# Patient Record
Sex: FEMALE | Race: WHITE
[De-identification: names, ages, dates, MRNs, and addresses within clinical notes are randomized per-mention and may not be internally consistent; named-entity substitution may affect disease eponyms.]

---

## 2019-10-18 ENCOUNTER — HOSPITAL ENCOUNTER (EMERGENCY)
Dept: HOSPITAL 41 - JD.ED | Age: 64
LOS: 1 days | Discharge: LEFT BEFORE DISCHARGE | End: 2019-10-19
Payer: MEDICARE

## 2019-10-18 VITALS — HEART RATE: 118 BPM | SYSTOLIC BLOOD PRESSURE: 165 MMHG | DIASTOLIC BLOOD PRESSURE: 111 MMHG

## 2019-10-18 DIAGNOSIS — Z79.899: ICD-10-CM

## 2019-10-18 DIAGNOSIS — Z87.891: ICD-10-CM

## 2019-10-18 DIAGNOSIS — R00.0: ICD-10-CM

## 2019-10-18 DIAGNOSIS — J44.9: ICD-10-CM

## 2019-10-18 DIAGNOSIS — Z79.84: ICD-10-CM

## 2019-10-18 DIAGNOSIS — Z88.6: ICD-10-CM

## 2019-10-18 DIAGNOSIS — E11.9: ICD-10-CM

## 2019-10-18 DIAGNOSIS — F32.9: ICD-10-CM

## 2019-10-18 DIAGNOSIS — E87.6: ICD-10-CM

## 2019-10-18 DIAGNOSIS — F41.9: Primary | ICD-10-CM

## 2019-10-18 DIAGNOSIS — Z88.5: ICD-10-CM

## 2019-10-19 NOTE — EDM.PDOCBH
ED HPI GENERAL MEDICAL PROBLEM





- General


Chief Complaint: Behavioral/Psych


Stated Complaint: [facility]


Time Seen by Provider: 10/19/19 01:11





- History of Present Illness


INITIAL COMMENTS - FREE TEXT/NARRATIVE: 





63-year-old female male brought in by EMS not feeling well.





Patient was having chest tightness palpitations was sweaty and generally didn't 

feel good. Upon arrival to the emergency room she felt much better she was 

hypertensive and tachycardic with a pulse rate in the low 100s. At the time of 

my initial interview the patient denied any pain she did feel little anxious 

palpitations have resolved. Apparently she would not tolerate the monitoring 

equipment and demand it was taken off I did convince her to check her blood 

pressure again and put heart monitor on.











- Related Data


 Allergies











Allergy/AdvReac Type Severity Reaction Status Date / Time


 


acetaminophen Allergy  Cannot Verified 10/18/19 22:43





[From Tylenol-Codeine #3]   Remember  


 


codeine Allergy  Cannot Verified 10/18/19 22:43





[From Tylenol-Codeine #3]   Remember  


 


morphine Allergy  Other Verified 10/18/19 22:43











Home Meds: 


 Home Meds





Clonidine. 1 tab PO DAILY 12/28/16 [History]


Cyclobenzaprine [Flexeril] 10 mg PO TID 12/28/16 [History]


Disipromine. 1 tab PO DAILY 12/28/16 [History]


Metformin. 1 tab PO BID 12/28/16 [History]


Pregabalin [Lyrica] 1 tab PO BID 12/28/16 [History]


Prozac. 1 tab PO DAILY 12/28/16 [History]


ARIPiprazole [Abilify] 10 mg PO DAILY 10/18/19 [History]


Alprazolam. 1 tab PO BEDTIME 10/18/19 [History]


Potassium Chloride [Klor-Con 10] 10 meq PO Q12H #10 tab.er 10/19/19 [Rx]











Past Medical History


Respiratory History: Reports: COPD


Musculoskeletal History: Reports: Fibromyalgia


Psychiatric History: Reports: Anxiety, Depression, PTSD, Schizophrenia


Endocrine/Metabolic History: Reports: Diabetes, Type II, Hypothyroidism





Social & Family History





- Tobacco Use


Smoking Status *Q: Former Smoker


Used Tobacco, but Quit: Yes


Month/Year Tobacco Last Used: 2018





- Caffeine Use


Caffeine Use: Reports: Coffee, Tea





- Recreational Drug Use


Recreational Drug Use: No





ED ROS GENERAL





- Review of Systems


Review Of Systems: See Below


Constitutional: Reports: No Symptoms


HEENT: Reports: No Symptoms


Respiratory: Reports: No Symptoms


Cardiovascular: Reports: Chest Pain, Palpitations


GI/Abdominal: Reports: No Symptoms


: Reports: No Symptoms


Musculoskeletal: Reports: No Symptoms


Skin: Reports: No Symptoms


Neurological: Reports: Other (Intermittent hand numbness).  Denies: Confusion, 

Dizziness, Headache





ED EXAM, BEHAVIORAL HEALTH





- Physical Exam


Exam: See Below


Exam Limited By: No Limitations


General Appearance: Alert, No Apparent Distress


Eye Exam: Bilateral Eye: Normal Inspection


Ears: Normal External Exam, Normal Canal, Hearing Grossly Normal, Normal TMs, 

Other (Him cerumen noted in the external canals)


Nose: Normal Inspection, Normal Mucosa, No Blood


Throat/Mouth: Normal Inspection, Normal Lips, Normal Teeth, Normal Gums, Normal 

Oropharynx, Normal Voice, No Airway Compromise


Head: Atraumatic, Normocephalic


Neck: Normal Inspection, Supple, Non-Tender, Full Range of Motion


Respiratory/Chest: No Respiratory Distress, Lungs Clear, Normal Breath Sounds, 

No Accessory Muscle Use, Chest Non-Tender


Cardiovascular: Normal Peripheral Pulses, Regular Rate, Rhythm, No Edema, No 

Gallop, No JVD, No Murmur, No Rub


GI/Abdominal: Normal Bowel Sounds, Soft, Non-Tender, No Organomegaly, No 

Distention, No Abnormal Bruit, No Mass, Other (Obese)


Back Exam: Normal Inspection.  No: CVA Tenderness (L), CVA Tenderness (R)


Neurological: Alert, Normal Cognition


Psychiatric: Other (She is somewhat anxious).  No: Suicidal Plan, Suicidal 

Thoughts, Auditory Hallucinations, Visual Hallucinations, Grandiose Thoughts, 

Pressured Speech


Skin Exam: Warm, Dry, Intact





COURSE, BEHAVIORAL HEALTH COMP





- Course


Vital Signs: 


 Last Vital Signs











Temp  36.2 C   10/18/19 22:40


 


Pulse  118 H  10/18/19 22:40


 


Resp  15   10/18/19 22:40


 


BP  165/111 H  10/18/19 22:40


 


Pulse Ox  91 L  10/18/19 22:40











Orders, Labs, Meds: 


 Active Orders 24 hr











 Category Date Time Status


 


 EKG Documentation Completion [RC] STAT Care  10/19/19 01:29 Active


 


 MAGNESIUM [CHEM] Stat Lab  10/19/19 02:34 Ordered








 Laboratory Tests











  10/19/19 10/19/19 Range/Units





  01:50 01:50 


 


WBC  5.91   (3.98-10.04)  K/mm3


 


RBC  4.18   (3.98-5.22)  M/mm3


 


Hgb  12.0  D   (11.2-15.7)  gm/dl


 


Hct  37.5   (34.1-44.9)  %


 


MCV  89.7   (79.4-94.8)  fl


 


MCH  28.7   (25.6-32.2)  pg


 


MCHC  32.0 L   (32.2-35.5)  g/dl


 


RDW Std Deviation  46.2   (36.4-46.3)  fL


 


Plt Count  142 L   (182-369)  K/mm3


 


MPV  11.0   (9.4-12.3)  fl


 


Neutrophils % (Manual)  75 H   (40-60)  %


 


Band Neutrophils %  0   (0-10)  %


 


Lymphocytes % (Manual)  18 L   (20-40)  %


 


Atypical Lymphs %  0   %


 


Monocytes % (Manual)  5   (2-10)  %


 


Eosinophils % (Manual)  2   (0.7-5.8)  %


 


Basophils % (Manual)  0 L   (0.1-1.2)  


 


Platelet Estimate  Adequate   


 


Plt Morphology Comment  Normal   


 


RBC Morph Comment  Normal   


 


Sodium   141  (136-145)  mEq/L


 


Potassium   3.3 L  (3.5-5.1)  mEq/L


 


Chloride   104  ()  mEq/L


 


Carbon Dioxide   28  (21-32)  mEq/L


 


Anion Gap   12.3  (5-15)  


 


BUN   16  (7-18)  mg/dL


 


Creatinine   0.8  (0.55-1.02)  mg/dL


 


Est Cr Clr Drug Dosing   66.45  mL/min


 


Estimated GFR (MDRD)   > 60  (>60)  mL/min


 


BUN/Creatinine Ratio   20.0 H  (14-18)  


 


Glucose   167 H  ()  mg/dL


 


Calcium   9.2  (8.5-10.1)  mg/dL


 


Total Bilirubin   0.4  (0.2-1.0)  mg/dL


 


AST   30  (15-37)  U/L


 


ALT   40  (14-59)  U/L


 


Alkaline Phosphatase   128 H  ()  U/L


 


Troponin I   < 0.017  (0.00-0.056)  ng/mL


 


Total Protein   7.8  (6.4-8.2)  g/dl


 


Albumin   3.0 L  (3.4-5.0)  g/dl


 


Globulin   4.8  gm/dL


 


Albumin/Globulin Ratio   0.6 L  (1-2)  








Medications














Discontinued Medications














Generic Name Dose Route Start Last Admin





  Trade Name Giacomo  PRN Reason Stop Dose Admin


 


Lorazepam  1 mg  10/19/19 02:35  





  Ativan  PO  10/19/19 02:36  





  ONETIME ONE   





     





     





     





     











Medical Clearance: 





10/19/19 03:00


I tried to get the patient to take some Ativan to calm her down and she agreed 

to it initially. Then I recommended she take some oral potassium as her 

potassium was somewhat low. Her blood sugar is high troponin still pending EKG 

shows a sinus tachycardia rate 103 and then she decided she didn't want to take 

the Ativan but would take potassium is still recommended she take the Ativan 

patient thought about it for a little bit and then she decided she just wanted 

to leave I discussed halle with her that I'm worried about her fast heart rate 

and have no good explanation for her symptoms other than the anxiety that she 

has enough risk factors that some more ominous intentional medical problems 

should be excluded if possible and she still wanted to go home I did discharge 

her with a prescription for potassium she was willing to take that.





Departure





- Departure


Time of Disposition: 02:44


Disposition: Against Medical Advice 07


Clinical Impression: 


 Anxiety, Tachycardia, Hypokalemia








- Discharge Information


Prescriptions: 


Potassium Chloride [Klor-Con 10] 10 meq PO Q12H #10 tab.er


Instructions:  Generalized Anxiety Disorder, Adult, Hypokalemia, Sinus 

Tachycardia


Referrals: 


PCP,None [Primary Care Provider] - 


Forms:  ED Department Discharge


Additional Instructions: 


Return to the emergency room with any questions problems worsening symptoms





Follow-up with her regular provider early this next week you can go to the 

Hospital clinic [phone]





- My Orders


Last 24 Hours: 


My Active Orders





10/19/19 01:29


EKG Documentation Completion [RC] STAT 





10/19/19 02:34


MAGNESIUM [CHEM] Stat 














- Assessment/Plan


Last 24 Hours: 


My Active Orders





10/19/19 01:29


EKG Documentation Completion [RC] STAT 





10/19/19 02:34


MAGNESIUM [CHEM] Stat

## 2019-10-24 ENCOUNTER — HOSPITAL ENCOUNTER (EMERGENCY)
Dept: HOSPITAL 41 - JD.ED | Age: 64
Discharge: PSYCHIATRIC OR SUBSTANCE USE FACILITY | End: 2019-10-24
Payer: MEDICARE

## 2019-10-24 VITALS — SYSTOLIC BLOOD PRESSURE: 151 MMHG | DIASTOLIC BLOOD PRESSURE: 104 MMHG | HEART RATE: 100 BPM

## 2019-10-24 DIAGNOSIS — Z88.5: ICD-10-CM

## 2019-10-24 DIAGNOSIS — Z88.6: ICD-10-CM

## 2019-10-24 DIAGNOSIS — G40.909: ICD-10-CM

## 2019-10-24 DIAGNOSIS — E11.9: ICD-10-CM

## 2019-10-24 DIAGNOSIS — J44.9: ICD-10-CM

## 2019-10-24 DIAGNOSIS — Z79.899: ICD-10-CM

## 2019-10-24 DIAGNOSIS — Z79.4: ICD-10-CM

## 2019-10-24 DIAGNOSIS — F32.9: ICD-10-CM

## 2019-10-24 DIAGNOSIS — F20.89: Primary | ICD-10-CM

## 2019-10-24 PROCEDURE — 71046 X-RAY EXAM CHEST 2 VIEWS: CPT

## 2019-10-24 PROCEDURE — 85025 COMPLETE CBC W/AUTO DIFF WBC: CPT

## 2019-10-24 PROCEDURE — 80306 DRUG TEST PRSMV INSTRMNT: CPT

## 2019-10-24 PROCEDURE — G0480 DRUG TEST DEF 1-7 CLASSES: HCPCS

## 2019-10-24 PROCEDURE — 84484 ASSAY OF TROPONIN QUANT: CPT

## 2019-10-24 PROCEDURE — 90686 IIV4 VACC NO PRSV 0.5 ML IM: CPT

## 2019-10-24 PROCEDURE — 99285 EMERGENCY DEPT VISIT HI MDM: CPT

## 2019-10-24 PROCEDURE — 36415 COLL VENOUS BLD VENIPUNCTURE: CPT

## 2019-10-24 PROCEDURE — 93005 ELECTROCARDIOGRAM TRACING: CPT

## 2019-10-24 PROCEDURE — 80053 COMPREHEN METABOLIC PANEL: CPT

## 2019-10-24 PROCEDURE — 84443 ASSAY THYROID STIM HORMONE: CPT

## 2019-10-24 PROCEDURE — G0008 ADMIN INFLUENZA VIRUS VAC: HCPCS

## 2019-10-24 NOTE — CR
Chest: Two views of the chest are obtained.

 

Comparison: Prior chest CT study of 12/28/16 is available.

 

Findings: Heart size and mediastinum are normal.  Lungs show minimal 

linear densities which is most likely due to subsegmental atelectasis.

  Lungs otherwise are clear.  Minimal scoliosis is noted within the 

spine with slight scattered degenerative change.

 

Impression:

1.  Findings as described above which is believed to be incidental.

2.  Nothing acute is suspected.

 

Diagnostic code #2
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
## 2020-01-06 ENCOUNTER — HOSPITAL ENCOUNTER (INPATIENT)
Dept: HOSPITAL 41 - JD.ED | Age: 65
LOS: 3 days | Discharge: NURSING, REHAB OR LONG-TERM CARE | DRG: 637 | End: 2020-01-09
Payer: MEDICARE

## 2020-01-06 DIAGNOSIS — B37.89: ICD-10-CM

## 2020-01-06 DIAGNOSIS — D72.828: ICD-10-CM

## 2020-01-06 DIAGNOSIS — E86.9: ICD-10-CM

## 2020-01-06 DIAGNOSIS — D72.829: ICD-10-CM

## 2020-01-06 DIAGNOSIS — S00.03XD: ICD-10-CM

## 2020-01-06 DIAGNOSIS — Z88.5: ICD-10-CM

## 2020-01-06 DIAGNOSIS — F32.9: ICD-10-CM

## 2020-01-06 DIAGNOSIS — R53.1: ICD-10-CM

## 2020-01-06 DIAGNOSIS — Z88.8: ICD-10-CM

## 2020-01-06 DIAGNOSIS — R32: ICD-10-CM

## 2020-01-06 DIAGNOSIS — Z79.84: ICD-10-CM

## 2020-01-06 DIAGNOSIS — J44.9: ICD-10-CM

## 2020-01-06 DIAGNOSIS — B37.2: ICD-10-CM

## 2020-01-06 DIAGNOSIS — E83.39: ICD-10-CM

## 2020-01-06 DIAGNOSIS — R27.0: ICD-10-CM

## 2020-01-06 DIAGNOSIS — F41.9: ICD-10-CM

## 2020-01-06 DIAGNOSIS — N18.3: ICD-10-CM

## 2020-01-06 DIAGNOSIS — E78.00: ICD-10-CM

## 2020-01-06 DIAGNOSIS — Z90.49: ICD-10-CM

## 2020-01-06 DIAGNOSIS — Z79.899: ICD-10-CM

## 2020-01-06 DIAGNOSIS — R74.0: ICD-10-CM

## 2020-01-06 DIAGNOSIS — F23: ICD-10-CM
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
## 2020-01-06 DIAGNOSIS — E11.10: Primary | ICD-10-CM

## 2020-01-06 DIAGNOSIS — E03.9: ICD-10-CM

## 2020-01-06 DIAGNOSIS — E11.22: ICD-10-CM

## 2020-01-06 DIAGNOSIS — E88.09: ICD-10-CM

## 2020-01-06 DIAGNOSIS — F20.9: ICD-10-CM

## 2020-01-06 DIAGNOSIS — G93.41: ICD-10-CM

## 2020-01-06 DIAGNOSIS — N17.9: ICD-10-CM

## 2020-01-06 DIAGNOSIS — E66.01: ICD-10-CM

## 2020-01-06 DIAGNOSIS — J10.1: ICD-10-CM

## 2020-01-06 DIAGNOSIS — E86.0: ICD-10-CM

## 2020-01-06 DIAGNOSIS — H54.7: ICD-10-CM

## 2020-01-06 DIAGNOSIS — M79.7: ICD-10-CM

## 2020-01-06 DIAGNOSIS — G40.909: ICD-10-CM

## 2020-01-06 LAB — HBA1C BLD-MCNC: 10.9 % (ref 4.5–6.2)

## 2020-01-06 PROCEDURE — C9113 INJ PANTOPRAZOLE SODIUM, VIA: HCPCS

## 2020-01-06 PROCEDURE — 80053 COMPREHEN METABOLIC PANEL: CPT

## 2020-01-06 PROCEDURE — 99285 EMERGENCY DEPT VISIT HI MDM: CPT

## 2020-01-06 PROCEDURE — 36415 COLL VENOUS BLD VENIPUNCTURE: CPT

## 2020-01-06 PROCEDURE — 84484 ASSAY OF TROPONIN QUANT: CPT

## 2020-01-06 PROCEDURE — 96360 HYDRATION IV INFUSION INIT: CPT

## 2020-01-06 PROCEDURE — 85007 BL SMEAR W/DIFF WBC COUNT: CPT

## 2020-01-06 PROCEDURE — 85610 PROTHROMBIN TIME: CPT

## 2020-01-06 PROCEDURE — 84100 ASSAY OF PHOSPHORUS: CPT

## 2020-01-06 PROCEDURE — 84443 ASSAY THYROID STIM HORMONE: CPT

## 2020-01-06 PROCEDURE — 83036 HEMOGLOBIN GLYCOSYLATED A1C: CPT

## 2020-01-06 PROCEDURE — 83930 ASSAY OF BLOOD OSMOLALITY: CPT

## 2020-01-06 PROCEDURE — 85027 COMPLETE CBC AUTOMATED: CPT

## 2020-01-06 PROCEDURE — 71045 X-RAY EXAM CHEST 1 VIEW: CPT

## 2020-01-06 PROCEDURE — 82009 KETONE BODYS QUAL: CPT

## 2020-01-06 PROCEDURE — 85025 COMPLETE CBC W/AUTO DIFF WBC: CPT

## 2020-01-06 PROCEDURE — 83880 ASSAY OF NATRIURETIC PEPTIDE: CPT

## 2020-01-06 PROCEDURE — 70450 CT HEAD/BRAIN W/O DYE: CPT

## 2020-01-06 PROCEDURE — 82803 BLOOD GASES ANY COMBINATION: CPT

## 2020-01-06 PROCEDURE — 36600 WITHDRAWAL OF ARTERIAL BLOOD: CPT

## 2020-01-06 PROCEDURE — 83605 ASSAY OF LACTIC ACID: CPT

## 2020-01-06 PROCEDURE — 93005 ELECTROCARDIOGRAM TRACING: CPT

## 2020-01-06 PROCEDURE — 86140 C-REACTIVE PROTEIN: CPT

## 2020-01-06 PROCEDURE — 87040 BLOOD CULTURE FOR BACTERIA: CPT

## 2020-01-06 PROCEDURE — 81001 URINALYSIS AUTO W/SCOPE: CPT

## 2020-01-06 PROCEDURE — 83735 ASSAY OF MAGNESIUM: CPT

## 2020-01-06 RX ADMIN — HEPARIN SODIUM SCH UNITS: 5000 INJECTION, SOLUTION INTRAVENOUS; SUBCUTANEOUS at 18:19

## 2020-01-06 NOTE — EDM.PDOC
ED HPI GENERAL MEDICAL PROBLEM





- General


Chief Complaint: Gastrointestinal Problem


Stated Complaint: [facility]


Time Seen by Provider: 01/06/20 12:49


Source of Information: Reports: Patient


History Limitations: Reports: No Limitations





- History of Present Illness


INITIAL COMMENTS - FREE TEXT/NARRATIVE: 


64-year-old female who is a known type II diabetic presents to the ED generally 

feeling very poor for the last 10 days. She recently relocated to [facility] and has no primary care physician. Her sugars used to be 

controlled with Trulicity once weekly  and using basal dose of insulin ( Lantus 

solostar)  30 units once daily daily and metformin 1000 mg twice a day. Since 

blood sugars at home have been running much higher than normal. She presents 

with severe polyuria and polydipsia. Weight herself and she is morbidly obese. 

She can barely walk or get to the bathroom even with the aid of her daughter 

who does most of her care. Haroldo she fell recently and has a mild contusion to 

her occipital scalp. She also complains that her right arm doesn't seem to work 

as well as it should. She doesn't walk without the aid of a walker. Complains 

of feeling very weak dizzy nauseated and can't eat.





Onset: Gradual


Onset Date: 01/01/20 (Has been sick apparently for a couple of months but worse 

the last week. In planning of diffuse abdominal pain)


Duration: Week(s):, Getting Worse


Location: Reports: Generalized (MRI sense of illness I suspect primarily due to 

uncontrolled type 2 diabetes.)


Quality: Reports: Other (Nausea weakness causing falls at home.)


Severity: Severe


Improves with: Reports: None


Worsens with: Reports: None


Context: Reports: Other.  Denies: Activity, Exercise, Lifting, Sick Contact, 

Trauma


Associated Symptoms: Reports: Loss of Appetite, Malaise, Nausea/Vomiting, 

Shortness of Breath, Weakness (Severe generalized weakness and can barely walk 

on her own volition. She is also hypoxic on room air.).  Denies: Confusion (

Artery is providing as much care she can for her at home but is failing at this 

time.), Chest Pain, Cough, cough w sputum, Diaphoresis, Fever/Chills, Headaches

, Rash, Seizure, Syncope


Treatments PTA: Reports: Other (see below)


  ** Left Abdominal


Pain Score (Numeric/FACES): 10





- Related Data


 Allergies











Allergy/AdvReac Type Severity Reaction Status Date / Time


 


acetaminophen Allergy  Cannot Verified 10/24/19 11:12





[From Tylenol-Codeine #3]   Remember  


 


codeine Allergy  Cannot Verified 10/24/19 11:12





[From Tylenol-Codeine #3]   Remember  


 


morphine AdvReac  Headache Verified 01/06/20 14:43











Home Meds: 


 Home Meds





Clonidine. 0.1 mg PO BID 12/28/16 [History]


Cyclobenzaprine [Flexeril] 10 mg PO TID PRN 12/28/16 [History]


Metformin. 1,000 mg PO BID 12/28/16 [History]


Pregabalin [Lyrica] 300 mg PO BID 12/28/16 [History]


Prozac. 80 mg PO DAILY 12/28/16 [History]


ARIPiprazole [Abilify] 40 mg PO DAILY 10/18/19 [History]


Alprazolam. 0.25 mg PO BID PRN 10/18/19 [History]


Dulaglutide [Trulicity] 1.5 mg SQ TU 10/24/19 [History]


Paliperidone [Paliperidone ER] 6 mg PO DAILY 01/06/20 [History]


Rosuvastatin Calcium 20 mg PO BEDTIME 01/06/20 [History]


hydrOXYzine HCL [hydrOXYzine] 50 mg PO BEDTIME 01/06/20 [History]











Past Medical History


HEENT History: Reports: Impaired Vision


Other HEENT History: wears eyeglasses, has no teeth, frequent abscesses to "nubs

" of teeth that are still in place.


Cardiovascular History: Reports: High Cholesterol


Respiratory History: Reports: COPD


Genitourinary History: Reports: Pyelonephritis, Urinary Incontinence, UTI, 

Recurrent


OB/GYN History: Reports: Pregnancy


Musculoskeletal History: Reports: Fracture, Fibromyalgia


Neurological History: Reports: Seizure


Psychiatric History: Reports: Anxiety, Depression, PTSD, Schizophrenia


Endocrine/Metabolic History: Reports: Diabetes, Type II, Hypothyroidism





- Infectious Disease History


Infectious Disease History: Reports: Chicken Pox, Hepatitis C, Measles, Mumps, 

Scarlet Fever





- Past Surgical History


GI Surgical History: Reports: Appendectomy, Cholecystectomy


Musculoskeletal Surgical History: Reports: ORIF





Social & Family History





- Tobacco Use


Smoking Status *Q: Never Smoker


Second Hand Smoke Exposure: No





- Caffeine Use


Caffeine Use: Reports: Coffee, Soda





- Recreational Drug Use


Recreational Drug Use: No





- Living Situation & Occupation


Living situation: Reports: Single


Occupation: Disabled





ED ROS GENERAL





- Review of Systems


Review Of Systems: See Below


Constitutional: Reports: Malaise, Weakness, Fatigue, Decreased Appetite.  Denies

: Fever, Chills


HEENT: Denies: Glasses


Respiratory: Reports: Shortness of Breath.  Denies: Wheezing, Pleuritic Chest 

Pain, Cough, Sputum


Cardiovascular: Reports: Blood Pressure Problem, Dyspnea on Exertion, Edema, 

Lightheadedness.  Denies: Chest Pain, Claudication, Orthopnea


Endocrine: Reports: Fatigue, High Glucose


GI/Abdominal: Reports: Decreased Appetite, Nausea, Vomiting (Vomited once 

bilious material last night)


: Reports: Frequency, Incontinence (Continent of urine at times mostly stress-

induced component of urgency.)


Musculoskeletal: Reports: Joint Pain (Sips low back and neck and shoulders at 

times)


Skin: Reports: Other (Patient has intertrigo primarily in the inguinal and 

lower abdomen under pannus. She also has perineal inflammation from inability 

to clean herself properly. His excoriations of the perineum and perianal 

tissues.)


Neurological: Reports: Difficulty Walking (Use a walker to get along but can't 

walk), Change in Speech ( at this point time.), Other (Complains of difficulty 

walking and that her right arm is not working quite as well as it should. Of 

note she is right hand dominant.)


Hematologic/Lymphatic: Reports: No Symptoms


Immunologic: Reports: No Symptoms





ED EXAM, GI/ABD





- Physical Exam


Exam: See Below


Exam Limited By: Altered Mental Status ( is mildly confused and can't 

answer questions appropriately. She has a friend with her who is providing most 

of the history.)


General Appearance: Mild Distress, Obese, Other (Confused. She is disoriented 

to person place and time.)


Eyes: Bilateral: Normal Appearance


Throat/Mouth: Other (Doesn't tongue is extremely dry and shrunken. Patient has 

ketones on her breath.).  No: Normal Teeth, Normal Gums


Head: Other


Neck: Normal Inspection (She is complaining of pain on the vertex of her 

occipital scalp but I cannot palpate any deformities or scalp hematomas. 

Apparently she fell and injured her head about a week ago.), Limited Range of 

Motion.  No: Carotid Bruit, Lymphadenopathy (L) (As a cold neck with limited 

range of motion.), Lymphadenopathy (R)


Respiratory/Chest: No Accessory Muscle Use, Respiratory Distress (Mild 

tachypnea at rest presumably due to ketosis.), Decreased Breath Sounds (

Diminished breath sounds to the lower lung fields due to splinting respirations 

and shallow respirations.).  No: Rales, Rhonchi, Wheezing


Cardiovascular: No Gallop (Sinus tachycardia at 125 at the bedside.), No JVD, 

No Murmur, No Rub, Tachycardia.  No: Normal Peripheral Pulses (No pulses 

palpable in her feet.), No Edema


GI/Abdominal Exam: Normal Bowel Sounds, Soft, Non-Tender, No Organomegaly, No 

Mass, Pelvis Stable, Other (Morbidly obese. Abdominal girth limits ability to 

palpate solid organs. She has a long linear open cholecystectomy scar right 

upper quadrant of the abdomen.)


 (Female) Exam: Other (Has excoriations of the perianal tissues and perineum 

with likely candidiasis in this area. All bladder are also somewhat involved.)


Rectal (Female) Exam: Other (Area anal inflamed inflammation due to 

excoriations of the tissue from likely stool.)


Back Exam: Decreased Range of Motion.  No: CVA Tenderness (L), Muscle Spasm, 

Paraspinal Tenderness


Extremities: Pedal Edema (1+ pitting edema around the ankles and dorsal feet.)


Neurological: Oriented (Oriented to person but not to place and time.), Sensory/

Motor Deficit (She is complaining of some numbness and tingling and poor 

ability to control her right upper extremity. States it's week.).  No: Alert, 

CN II-XII Intact, Normal Cognition, Normal Gait


Psychiatric: Flat Affect


Skin Exam: Warm, Dry, Other (She has intertrigo primarily in the undersurface 

of her abdominal pannus in both groins and suprapubic area. This is secondary 

to candidiasis. Similarly there is perianal and nuchal cleft excoriations with 

white discharge likely candidiasis as well.)





EKG INTERPRETATION


EKG Date: 01/06/20


Time: 13:25


Rhythm: Other


Rate (Beats/Min): 123


Axis: LAD-Left Axis Deviation


P-Wave: Enlarged (-21 consider left atrial hypertrophy)


QRS: Other (There is a Q-wave in V1. There is diffuse poor R-wave progression 

throughout all of the precordial leads with decreased voltage in both limb and 

precordial leads. Rule out ischemia in the anteroseptal wall.)


ST-T: Other (Wondering baseline in the limb leads extend on amenable to 

interpretation of the ST segments.)


QT: Prolonged


EKG Interpretation Comments: 


Abnormal ECG








Course





- Vital Signs


Last Recorded V/S: 


 Last Vital Signs











Temp  36.4 C   01/06/20 11:50


 


Pulse  122 H  01/06/20 11:50


 


Resp  20   01/06/20 11:50


 


BP  137/81   01/06/20 11:50


 


Pulse Ox  93 L  01/06/20 11:50














- Orders/Labs/Meds


Orders: 


 Active Orders 24 hr











 Category Date Time Status


 


 Blood Glucose Check, Bedside [RC] ONETIME Care  01/06/20 12:58 Active


 


 EKG Documentation Completion [RC] STAT Care  01/06/20 12:57 Active


 


 Insert Patterson Catheter [Insert Urinary Catheter] [OM.PC] Care  01/06/20 12:30 

Ordered





 Q24H   


 


 Urinary Catheter Assessment [RC] ASDIRECTED Care  01/06/20 12:30 Active


 


 CULTURE BLOOD [BC] Stat Lab  01/06/20 12:58 Ordered


 


 CULTURE BLOOD [BC] Stat Lab  01/06/20 12:58 Ordered


 


 Insulin Regular, Human [HumuLIN R] 100 unit Med  01/06/20 14:37 Active





 Sodium Chloride 0.9% [Normal Saline] 99 ml   





 IV TITRATE   


 


 Blood Culture x2 Reflex Set [OM.PC] Stat Oth  01/06/20 12:58 Ordered








 Medication Orders





Albuterol/Ipratropium (Duoneb 3.0-0.5 Mg/3 Ml)  3 ml NEB Q4H PRN


   PRN Reason: Shortness Of Breath/wheezing


Heparin Sodium (Porcine) (Heparin Sodium)  5,000 units SUBCUT Q8H [facility]YLON


Hydromorphone HCl (Dilaudid)  0.5 mg IVPUSH Q2H PRN


   PRN Reason: Pain (severe 7-10)


Insulin Human Regular 100 unit (/ Sodium Chloride)  100 mls @ 10 mls/hr IV 

TITRATE DYLON; Protocol


   Last Admin: 01/06/20 14:56  Dose: 6 unit/hr, 6 mls/hr


Potassium Chloride 10 meq/ (Premix)  100 mls @ 100 mls/hr IV Q1H DYLON


   Stop: 01/06/20 18:14


   Last Admin: 01/06/20 17:15  Dose: 100 mls/hr


   Infusion: 01/06/20 16:35  Dose: 100 mls/hr


   Admin: 01/06/20 15:35  Dose: 100 mls/hr


Lactated Ringer's (Ringers, Lactated)  1,000 mls @ 125 mls/hr IV ASDIRECTED [facility]


Promethazine HCl 6.25 mg/ (Sodium Chloride)  50.25 mls @ 100 mls/hr IV Q6H PRN


   PRN Reason: Nausea/Vomiting


Ibuprofen (Motrin)  600 mg PO Q6H PRN


   PRN Reason: Pain (moderate 4-6)


Ondansetron HCl (Zofran)  4 mg IV Q6H PRN


   PRN Reason: Nausea/Vomiting


Pantoprazole Sodium (Protonix Iv***)  40 mg IV Q12H [facility]








Labs: 


 Laboratory Tests











  01/06/20 01/06/20 01/06/20 Range/Units





  12:40 12:40 12:41 


 


WBC    14.86 H  (3.98-10.04)  K/mm3


 


RBC    5.92 H  (3.98-5.22)  M/mm3


 


Hgb    16.4 H D  (11.2-15.7)  gm/dl


 


Hct    49.6 H  (34.1-44.9)  %


 


MCV    83.8  D  (79.4-94.8)  fl


 


MCH    27.7  (25.6-32.2)  pg


 


MCHC    33.1  (32.2-35.5)  g/dl


 


RDW Std Deviation    43.9  (36.4-46.3)  fL


 


Plt Count    308  D  (182-369)  K/mm3


 


MPV    13.2 H  (9.4-12.3)  fl


 


Neut % (Auto)    84.6 H  (34.0-71.1)  %


 


Lymph % (Auto)    7.9 L  (19.3-51.7)  %


 


Mono % (Auto)    7.2  (4.7-12.5)  %


 


Eos % (Auto)    0 L  (0.7-5.8)  


 


Baso % (Auto)    0.2  (0.1-1.2)  %


 


Neut # (Auto)    12.57 H  (1.56-6.13)  K/mm3


 


Lymph # (Auto)    1.17 L  (1.18-3.74)  K/mm3


 


Mono # (Auto)    1.07 H  (0.24-0.36)  K/mm3


 


Eos # (Auto)    0.00 L  (0.04-0.36)  K/mm3


 


Baso # (Auto)    0.03  (0.01-0.08)  K/mm3


 


Neutrophils % (Manual)     (40-60)  %


 


Band Neutrophils %     (0-10)  %


 


Lymphocytes % (Manual)     (20-40)  %


 


Atypical Lymphs %     %


 


Monocytes % (Manual)     (2-10)  %


 


Eosinophils % (Manual)     (0.7-5.8)  %


 


Basophils % (Manual)     (0.1-1.2)  


 


Manual Slide Review    Abnormal smear  


 


Platelet Estimate     


 


Anisocytosis     


 


RBC Morph Comment     


 


PT     (9.7-12.0)  SECONDS


 


INR     


 


Puncture Site     


 


ABG pH     (7.35-7.45)  


 


ABG pCO2     (35.0-45.0)  mmHg


 


ABG HCO3     (22.0-26.0)  meq/L


 


ABG O2 Saturation     (96.0-97.0)  %


 


ABG Base Excess     (-2-2.0)  


 


Yuri Test     


 


O2 Delivery Device     


 


FiO2     (21..00)  %


 


Sodium  145    (136-145)  mEq/L


 


Potassium  3.8    (3.5-5.1)  mEq/L


 


Chloride  95 L    ()  mEq/L


 


Carbon Dioxide  22    (21-32)  mEq/L


 


Anion Gap  31.8 H    (5-15)  


 


BUN  28 H    (7-18)  mg/dL


 


Creatinine  1.5 H    (0.55-1.02)  mg/dL


 


Est Cr Clr Drug Dosing  34.99    mL/min


 


Estimated GFR (MDRD)  35    (>60)  mL/min


 


BUN/Creatinine Ratio  18.7 H    (14-18)  


 


Glucose  677 H*    ()  mg/dL


 


Hemoglobin A1c     (4.50-6.20)  %


 


Serum Osmolality     (280-300)  mosm/kg


 


Lactic Acid     (0.4-2.0)  mmol/L


 


Calcium  9.5    (8.5-10.1)  mg/dL


 


Phosphorus     (2.6-4.7)  mg/dL


 


Magnesium     (1.8-2.4)  mg/dl


 


Total Bilirubin  1.0    (0.2-1.0)  mg/dL


 


AST  60 H    (15-37)  U/L


 


ALT  70 H    (14-59)  U/L


 


Alkaline Phosphatase  257 H    ()  U/L


 


Troponin I     (0.00-0.056)  ng/mL


 


C-Reactive Protein     (<1.0)  mg/dL


 


NT-Pro-B Natriuret Pep   170 H   (0-125)  pg/mL


 


Total Protein  8.5 H    (6.4-8.2)  g/dl


 


Albumin  3.1 L    (3.4-5.0)  g/dl


 


Globulin  5.4    gm/dL


 


Albumin/Globulin Ratio  0.6 L    (1-2)  


 


TSH 3rd Generation     (0.358-3.74)  uIU/mL


 


Urine Color     (Yellow)  


 


Urine Appearance     (Clear)  


 


Urine pH     (5.0-8.0)  


 


Ur Specific Gravity     (1.005-1.030)  


 


Urine Protein     (Negative)  


 


Urine Glucose (UA)     (Negative)  


 


Urine Ketones     (Negative)  


 


Urine Occult Blood     (Negative)  


 


Urine Nitrite     (Negative)  


 


Urine Bilirubin     (Negative)  


 


Urine Urobilinogen     (0.2-1.0)  


 


Ur Leukocyte Esterase     (Negative)  


 


Urine RBC     (0-5)  /hpf


 


Urine WBC     (0-5)  /hpf


 


Ur Squamous Epith Cells     (0-5)  /hpf


 


Urine Bacteria     (FEW)  /hpf


 


Hyaline Casts     (0-5)  /lpf


 


Urine Mucus     (FEW)  /hpf


 


Ketones     (0.0-0.3)  mM














  01/06/20 01/06/20 01/06/20 Range/Units





  12:50 13:01 14:10 


 


WBC    15.38 H  (3.98-10.04)  K/mm3


 


RBC    5.94 H  (3.98-5.22)  M/mm3


 


Hgb    16.6 H  (11.2-15.7)  gm/dl


 


Hct    49.9 H  (34.1-44.9)  %


 


MCV    84.0  (79.4-94.8)  fl


 


MCH    27.9  (25.6-32.2)  pg


 


MCHC    33.3  (32.2-35.5)  g/dl


 


RDW Std Deviation    44.4  (36.4-46.3)  fL


 


Plt Count    228  D  (182-369)  K/mm3


 


MPV    12.7 H  (9.4-12.3)  fl


 


Neut % (Auto)     (34.0-71.1)  %


 


Lymph % (Auto)     (19.3-51.7)  %


 


Mono % (Auto)     (4.7-12.5)  %


 


Eos % (Auto)     (0.7-5.8)  


 


Baso % (Auto)     (0.1-1.2)  %


 


Neut # (Auto)     (1.56-6.13)  K/mm3


 


Lymph # (Auto)     (1.18-3.74)  K/mm3


 


Mono # (Auto)     (0.24-0.36)  K/mm3


 


Eos # (Auto)     (0.04-0.36)  K/mm3


 


Baso # (Auto)     (0.01-0.08)  K/mm3


 


Neutrophils % (Manual)    84 H  (40-60)  %


 


Band Neutrophils %    0  (0-10)  %


 


Lymphocytes % (Manual)    8 L  (20-40)  %


 


Atypical Lymphs %    0  %


 


Monocytes % (Manual)    7  (2-10)  %


 


Eosinophils % (Manual)    1  (0.7-5.8)  %


 


Basophils % (Manual)    0 L  (0.1-1.2)  


 


Manual Slide Review     


 


Platelet Estimate    Adequate  


 


Anisocytosis    1+ slight  


 


RBC Morph Comment    Not Reportable  


 


PT     (9.7-12.0)  SECONDS


 


INR     


 


Puncture Site   Rt radial   


 


ABG pH   7.40   (7.35-7.45)  


 


ABG pCO2   28.5 L   (35.0-45.0)  mmHg


 


ABG HCO3   17.3 L   (22.0-26.0)  meq/L


 


ABG O2 Saturation   94.4 L   (96.0-97.0)  %


 


ABG Base Excess   -5.7 L   (-2-2.0)  


 


Yuri Test   Positive   


 


O2 Delivery Device   Room air   


 


FiO2   0.00 L   (21..00)  %


 


Sodium     (136-145)  mEq/L


 


Potassium     (3.5-5.1)  mEq/L


 


Chloride     ()  mEq/L


 


Carbon Dioxide     (21-32)  mEq/L


 


Anion Gap     (5-15)  


 


BUN     (7-18)  mg/dL


 


Creatinine     (0.55-1.02)  mg/dL


 


Est Cr Clr Drug Dosing     mL/min


 


Estimated GFR (MDRD)     (>60)  mL/min


 


BUN/Creatinine Ratio     (14-18)  


 


Glucose     ()  mg/dL


 


Hemoglobin A1c     (4.50-6.20)  %


 


Serum Osmolality     (280-300)  mosm/kg


 


Lactic Acid     (0.4-2.0)  mmol/L


 


Calcium     (8.5-10.1)  mg/dL


 


Phosphorus     (2.6-4.7)  mg/dL


 


Magnesium     (1.8-2.4)  mg/dl


 


Total Bilirubin     (0.2-1.0)  mg/dL


 


AST     (15-37)  U/L


 


ALT     (14-59)  U/L


 


Alkaline Phosphatase     ()  U/L


 


Troponin I     (0.00-0.056)  ng/mL


 


C-Reactive Protein     (<1.0)  mg/dL


 


NT-Pro-B Natriuret Pep     (0-125)  pg/mL


 


Total Protein     (6.4-8.2)  g/dl


 


Albumin     (3.4-5.0)  g/dl


 


Globulin     gm/dL


 


Albumin/Globulin Ratio     (1-2)  


 


TSH 3rd Generation     (0.358-3.74)  uIU/mL


 


Urine Color  Yellow    (Yellow)  


 


Urine Appearance  Clear    (Clear)  


 


Urine pH  6.0    (5.0-8.0)  


 


Ur Specific Gravity  1.020    (1.005-1.030)  


 


Urine Protein  1+ H    (Negative)  


 


Urine Glucose (UA)  2+ H    (Negative)  


 


Urine Ketones  2+ H    (Negative)  


 


Urine Occult Blood  2+ H    (Negative)  


 


Urine Nitrite  Negative    (Negative)  


 


Urine Bilirubin  1+ H    (Negative)  


 


Urine Urobilinogen  0.2    (0.2-1.0)  


 


Ur Leukocyte Esterase  Negative    (Negative)  


 


Urine RBC  10-20 H    (0-5)  /hpf


 


Urine WBC  0-5    (0-5)  /hpf


 


Ur Squamous Epith Cells  0-5    (0-5)  /hpf


 


Urine Bacteria  Many H    (FEW)  /hpf


 


Hyaline Casts  0-5    (0-5)  /lpf


 


Urine Mucus  Few    (FEW)  /hpf


 


Ketones     (0.0-0.3)  mM














  01/06/20 01/06/20 01/06/20 Range/Units





  14:10 14:10 14:10 


 


WBC     (3.98-10.04)  K/mm3


 


RBC     (3.98-5.22)  M/mm3


 


Hgb     (11.2-15.7)  gm/dl


 


Hct     (34.1-44.9)  %


 


MCV     (79.4-94.8)  fl


 


MCH     (25.6-32.2)  pg


 


MCHC     (32.2-35.5)  g/dl


 


RDW Std Deviation     (36.4-46.3)  fL


 


Plt Count     (182-369)  K/mm3


 


MPV     (9.4-12.3)  fl


 


Neut % (Auto)     (34.0-71.1)  %


 


Lymph % (Auto)     (19.3-51.7)  %


 


Mono % (Auto)     (4.7-12.5)  %


 


Eos % (Auto)     (0.7-5.8)  


 


Baso % (Auto)     (0.1-1.2)  %


 


Neut # (Auto)     (1.56-6.13)  K/mm3


 


Lymph # (Auto)     (1.18-3.74)  K/mm3


 


Mono # (Auto)     (0.24-0.36)  K/mm3


 


Eos # (Auto)     (0.04-0.36)  K/mm3


 


Baso # (Auto)     (0.01-0.08)  K/mm3


 


Neutrophils % (Manual)     (40-60)  %


 


Band Neutrophils %     (0-10)  %


 


Lymphocytes % (Manual)     (20-40)  %


 


Atypical Lymphs %     %


 


Monocytes % (Manual)     (2-10)  %


 


Eosinophils % (Manual)     (0.7-5.8)  %


 


Basophils % (Manual)     (0.1-1.2)  


 


Manual Slide Review     


 


Platelet Estimate     


 


Anisocytosis     


 


RBC Morph Comment     


 


PT  13.9 H    (9.7-12.0)  SECONDS


 


INR  1.29    


 


Puncture Site     


 


ABG pH     (7.35-7.45)  


 


ABG pCO2     (35.0-45.0)  mmHg


 


ABG HCO3     (22.0-26.0)  meq/L


 


ABG O2 Saturation     (96.0-97.0)  %


 


ABG Base Excess     (-2-2.0)  


 


Yuri Test     


 


O2 Delivery Device     


 


FiO2     (21..00)  %


 


Sodium     (136-145)  mEq/L


 


Potassium     (3.5-5.1)  mEq/L


 


Chloride     ()  mEq/L


 


Carbon Dioxide     (21-32)  mEq/L


 


Anion Gap     (5-15)  


 


BUN     (7-18)  mg/dL


 


Creatinine     (0.55-1.02)  mg/dL


 


Est Cr Clr Drug Dosing     mL/min


 


Estimated GFR (MDRD)     (>60)  mL/min


 


BUN/Creatinine Ratio     (14-18)  


 


Glucose     ()  mg/dL


 


Hemoglobin A1c     (4.50-6.20)  %


 


Serum Osmolality   351 H   (280-300)  mosm/kg


 


Lactic Acid    2.7 H*  (0.4-2.0)  mmol/L


 


Calcium     (8.5-10.1)  mg/dL


 


Phosphorus   4.8 H   (2.6-4.7)  mg/dL


 


Magnesium   2.3   (1.8-2.4)  mg/dl


 


Total Bilirubin     (0.2-1.0)  mg/dL


 


AST     (15-37)  U/L


 


ALT     (14-59)  U/L


 


Alkaline Phosphatase     ()  U/L


 


Troponin I   < 0.017   (0.00-0.056)  ng/mL


 


C-Reactive Protein   1.7 H*   (<1.0)  mg/dL


 


NT-Pro-B Natriuret Pep     (0-125)  pg/mL


 


Total Protein     (6.4-8.2)  g/dl


 


Albumin     (3.4-5.0)  g/dl


 


Globulin     gm/dL


 


Albumin/Globulin Ratio     (1-2)  


 


TSH 3rd Generation   1.097   (0.358-3.74)  uIU/mL


 


Urine Color     (Yellow)  


 


Urine Appearance     (Clear)  


 


Urine pH     (5.0-8.0)  


 


Ur Specific Gravity     (1.005-1.030)  


 


Urine Protein     (Negative)  


 


Urine Glucose (UA)     (Negative)  


 


Urine Ketones     (Negative)  


 


Urine Occult Blood     (Negative)  


 


Urine Nitrite     (Negative)  


 


Urine Bilirubin     (Negative)  


 


Urine Urobilinogen     (0.2-1.0)  


 


Ur Leukocyte Esterase     (Negative)  


 


Urine RBC     (0-5)  /hpf


 


Urine WBC     (0-5)  /hpf


 


Ur Squamous Epith Cells     (0-5)  /hpf


 


Urine Bacteria     (FEW)  /hpf


 


Hyaline Casts     (0-5)  /lpf


 


Urine Mucus     (FEW)  /hpf


 


Ketones     (0.0-0.3)  mM














  01/06/20 01/06/20 Range/Units





  14:10 14:10 


 


WBC    (3.98-10.04)  K/mm3


 


RBC    (3.98-5.22)  M/mm3


 


Hgb    (11.2-15.7)  gm/dl


 


Hct    (34.1-44.9)  %


 


MCV    (79.4-94.8)  fl


 


MCH    (25.6-32.2)  pg


 


MCHC    (32.2-35.5)  g/dl


 


RDW Std Deviation    (36.4-46.3)  fL


 


Plt Count    (182-369)  K/mm3


 


MPV    (9.4-12.3)  fl


 


Neut % (Auto)    (34.0-71.1)  %


 


Lymph % (Auto)    (19.3-51.7)  %


 


Mono % (Auto)    (4.7-12.5)  %


 


Eos % (Auto)    (0.7-5.8)  


 


Baso % (Auto)    (0.1-1.2)  %


 


Neut # (Auto)    (1.56-6.13)  K/mm3


 


Lymph # (Auto)    (1.18-3.74)  K/mm3


 


Mono # (Auto)    (0.24-0.36)  K/mm3


 


Eos # (Auto)    (0.04-0.36)  K/mm3


 


Baso # (Auto)    (0.01-0.08)  K/mm3


 


Neutrophils % (Manual)    (40-60)  %


 


Band Neutrophils %    (0-10)  %


 


Lymphocytes % (Manual)    (20-40)  %


 


Atypical Lymphs %    %


 


Monocytes % (Manual)    (2-10)  %


 


Eosinophils % (Manual)    (0.7-5.8)  %


 


Basophils % (Manual)    (0.1-1.2)  


 


Manual Slide Review    


 


Platelet Estimate    


 


Anisocytosis    


 


RBC Morph Comment    


 


PT    (9.7-12.0)  SECONDS


 


INR    


 


Puncture Site    


 


ABG pH    (7.35-7.45)  


 


ABG pCO2    (35.0-45.0)  mmHg


 


ABG HCO3    (22.0-26.0)  meq/L


 


ABG O2 Saturation    (96.0-97.0)  %


 


ABG Base Excess    (-2-2.0)  


 


Yuri Test    


 


O2 Delivery Device    


 


FiO2    (21..00)  %


 


Sodium    (136-145)  mEq/L


 


Potassium    (3.5-5.1)  mEq/L


 


Chloride    ()  mEq/L


 


Carbon Dioxide    (21-32)  mEq/L


 


Anion Gap    (5-15)  


 


BUN    (7-18)  mg/dL


 


Creatinine    (0.55-1.02)  mg/dL


 


Est Cr Clr Drug Dosing    mL/min


 


Estimated GFR (MDRD)    (>60)  mL/min


 


BUN/Creatinine Ratio    (14-18)  


 


Glucose    ()  mg/dL


 


Hemoglobin A1c   10.90 H  (4.50-6.20)  %


 


Serum Osmolality    (280-300)  mosm/kg


 


Lactic Acid    (0.4-2.0)  mmol/L


 


Calcium    (8.5-10.1)  mg/dL


 


Phosphorus    (2.6-4.7)  mg/dL


 


Magnesium    (1.8-2.4)  mg/dl


 


Total Bilirubin    (0.2-1.0)  mg/dL


 


AST    (15-37)  U/L


 


ALT    (14-59)  U/L


 


Alkaline Phosphatase    ()  U/L


 


Troponin I    (0.00-0.056)  ng/mL


 


C-Reactive Protein    (<1.0)  mg/dL


 


NT-Pro-B Natriuret Pep    (0-125)  pg/mL


 


Total Protein    (6.4-8.2)  g/dl


 


Albumin    (3.4-5.0)  g/dl


 


Globulin    gm/dL


 


Albumin/Globulin Ratio    (1-2)  


 


TSH 3rd Generation    (0.358-3.74)  uIU/mL


 


Urine Color    (Yellow)  


 


Urine Appearance    (Clear)  


 


Urine pH    (5.0-8.0)  


 


Ur Specific Gravity    (1.005-1.030)  


 


Urine Protein    (Negative)  


 


Urine Glucose (UA)    (Negative)  


 


Urine Ketones    (Negative)  


 


Urine Occult Blood    (Negative)  


 


Urine Nitrite    (Negative)  


 


Urine Bilirubin    (Negative)  


 


Urine Urobilinogen    (0.2-1.0)  


 


Ur Leukocyte Esterase    (Negative)  


 


Urine RBC    (0-5)  /hpf


 


Urine WBC    (0-5)  /hpf


 


Ur Squamous Epith Cells    (0-5)  /hpf


 


Urine Bacteria    (FEW)  /hpf


 


Hyaline Casts    (0-5)  /lpf


 


Urine Mucus    (FEW)  /hpf


 


Ketones  14.52   (0.0-0.3)  mM











Meds: 


Medications











Generic Name Dose Route Start Last Admin





  Trade Name Freq  PRN Reason Stop Dose Admin


 


Albuterol/Ipratropium  3 ml  01/06/20 17:10  





  Duoneb 3.0-0.5 Mg/3 Ml  NEB   





  Q4H PRN   





  Shortness Of Breath/wheezing   





     





     





     


 


Heparin Sodium (Porcine)  5,000 units  01/06/20 18:00  





  Heparin Sodium  SUBCUT   





  Q8H DYLON   





     





     





     





     


 


Hydromorphone HCl  0.5 mg  01/06/20 17:10  





  Dilaudid  IVPUSH   





  Q2H PRN   





  Pain (severe 7-10)   





     





     





     


 


Insulin Human Regular 100 unit  100 mls @ 10 mls/hr  01/06/20 14:37  01/06/20 14

:56





  / Sodium Chloride  IV   6 unit/hr





  TITRATE DYLON   6 mls/hr





     Administration





     





  Protocol   





  10 UNIT/HR   


 


Potassium Chloride 10 meq/  100 mls @ 100 mls/hr  01/06/20 15:15  01/06/20 17:15





  Premix  IV  01/06/20 18:14  100 mls/hr





  Q1H DYLON   Administration





     





     





     





     


 


Lactated Ringer's  1,000 mls @ 125 mls/hr  01/06/20 17:15  





  Ringers, Lactated  IV   





  ASDIRECTED DYLON   





     





     





     





     


 


Promethazine HCl 6.25 mg/  50.25 mls @ 100 mls/hr  01/06/20 17:10  





  Sodium Chloride  IV   





  Q6H PRN   





  Nausea/Vomiting   





     





     





     


 


Ibuprofen  600 mg  01/06/20 17:10  





  Motrin  PO   





  Q6H PRN   





  Pain (moderate 4-6)   





     





     





     


 


Ondansetron HCl  4 mg  01/06/20 17:10  





  Zofran  IV   





  Q6H PRN   





  Nausea/Vomiting   





     





     





     


 


Pantoprazole Sodium  40 mg  01/06/20 18:00  





  Protonix Iv***  IV   





  Q12H DYLON   





     





     





     





     














Discontinued Medications














Generic Name Dose Route Start Last Admin





  Trade Name Freq  PRN Reason Stop Dose Admin


 


Hydromorphone HCl  0.5 mg  01/06/20 14:20  01/06/20 14:47





  Dilaudid  IVPUSH  01/06/20 14:21  0.5 mg





  ONETIME ONE   Administration





     





     





     





     


 


Sodium Chloride  1,000 mls @ 999 mls/hr  01/06/20 13:00  01/06/20 13:20





  Normal Saline  IV   999 mls/hr





  ASDIRECTED DYLON   Administration





     





     





     





     


 


Insulin Human Regular 100 unit  100 mls @ 10.7 mls/hr  01/06/20 14:00  





  / Sodium Chloride  IV   





  TITRATE DYLON   





     





     





  Protocol   





  0.1 UNITS/KG/HR   


 


Sodium Chloride  1,000 mls @ 999 mls/hr  01/06/20 15:15  01/06/20 15:30





  Normal Saline  IV   999 mls/hr





  ASDIRECTED DYLON   Administration





     





     





     





     














- Radiology Interpretation


Free Text/Narrative:: 





64-year-old female who is a known type II diabetic for an unknown length. Of 

time presents to the ED with uncontrolled type 2 diabetes. She smells strongly 

of ketones. Apparently her health and strength of been going downhill for over 

a week. She reports a fall striking the vertex of her occipital scalp about a 

week ago. Weakness although range of motion is normal she does have mild ataxia 

of the right upper limb. Patient spells strongly of ketones. Is for mold and 

shrunken and she is clinically volume depleted. It's unclear what her blood 

sugars are. Nurses got blood sugar greater than 400 in the ED. Appears on It 

and not capable of looking after herself at this point time. Apparently there 

is a friend with her that is trying to help her out. He does not have a primary 

care physician in town since moving or relocating to [place] from [employer], [place]. Patient is on a basal dose of insulin and to Trulicity  once weekly. 

Plan urine catheter specimen to be obtained due to marked excoriations of the 

perineum perianal tissues and vulva from candidiasis. There is also candidiasis 

across the lower abdominal wall or intertrigo under the pannus. She is morbidly 

obese. She has sinus tachycardia at rest. An IV will be normal saline at open. 

Routine labs including glycosylated protein in blood sugar and serum magnesium 

to be obtained as well as serum ketones, lactic acid











- Re-Assessments/Exams


Free Text/Narrative Re-Assessment/Exam: 





01/06/20 13:57 Blood cell count is elevated at 14.86. Auto differential shows 

84.6% neutrophils. Hemoglobin is 16.4 with hematocrit of 49.6 suggesting some 

degree of hemoconcentration. 3.8. Platelet count 308,000. ABGs reveal a pH of 

7.40 with a PCO2 of 28.5. Bicarbonate is low at 17.3. O2 sats are 94.4% on room 

air. His 145 with a potassium of 3.8. Chloride 95 with a bicarbonate of 22. 

Anion gap is 31.8 markedly elevated. BUN is elevated at 28 with a creatinine 

1.5. GFR is 35 i.e. stage III chronic kidney disease. BUN/creatinine ratio 

slightly elevated at 18.7. Glucose is elevated at 677. Calcium 9.5 with a 

bilirubin of 1.0. AST is mildly elevated at 60. ALT is 70. Alk phosphatase 257. 

Total protein is 8.5. Albumin fraction is low at 3.1. Urinalysis shows 1+ 

proteinuria 2+ glucosuria 2+ ketonuria and 2+ occult blood. There is also 1+ 

bilirubin. The slide shows 10-20 RBCs per high-power field and 0-5 wbc's. Many 

bacteria reported. Urine culture will be ordered. She'll be started on insulin 

infusion at 6 units an hour. Is to be checked every hourly.





01/06/20 14:35  still awaiting her serum ketones, lactic acid. BNP did return 

at 170. Chest  x-ray reveals slightly tortuous thoracic aorta. Cardiac 

silhouette is otherwise normal in size and shape. Lungs are clear with no 

pleural effusions or diffuse vascular congestion. ET had reveals age-

appropriate degenerative changes. There does appear to be an old lacunar 

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 

effect. There is mild diffuse or changes in both basal ganglia.





01/06/20 15:13  Lactic acid came back elevated at 2.7. Her potassium is low 

normal at 3.8 and with the fluids and insulin infusion he will go low. I will 

give her K rider at this time repeat 2. Serum osmolality has not yet returned.





01/06/20 16:32 Lactic acid is 2.7. Her hemoglobin A1c was 10.90. BNP is 170. 

Room osmolality is elevated at 351 case has been discussed with Dr. Larose on 

call hospitalist and he will be admitting her to the intensive care unit for 

definitive management of diabetic ketoacidosis. At this time no obvious 

infection has been identified. Lactic acidosis appears to be elevated secondary 

to volume depletion and diabetic ketoacidosis.





01/06/20 17:51 room apparently in  was now available. Last blood sugar was 

down to 430 1644 hrs. Of note her lactic acid second value actually went up to 

290 from 270. He received a third liter of normal saline. Her insulin drip will 

be decreased from 6 units an hour down to 3 units an hour.














Departure





- Departure


Time of Disposition: 14:42


Disposition: DC/[facility]fer to Acute Hospital [facility]


Condition: Fair


Clinical Impression: 


 Volume depletion, Chronic renal insufficiency, stage III (moderate)





Diabetic ketoacidosis associated with type 2 diabetes mellitus


Qualifiers:


 Diabetes mellitus complication detail: without coma Qualified Code(s): E11.10 

- Type 2 diabetes mellitus with ketoacidosis without coma





Leukocytosis, unspecified


Qualifiers:


 Leukocytosis type: other Qualified Code(s): D72.828 - Other elevated white 

blood cell count








- Discharge Information





Sepsis Event Note





- Evaluation


Sepsis Screening Result: No Definite Risk





- Focused Exam


Vital Signs: 


 Vital Signs











  Temp Pulse Resp BP Pulse Ox


 


 01/06/20 11:50  36.4 C  122 H  20  137/81  93 L











Date Exam was Performed: 01/06/20


Time Exam was Performed: 17:50





- My Orders


Last 24 Hours: 


My Active Orders





01/06/20 12:30


Insert Patterson Catheter [Insert Urinary Catheter] [OM.PC] Q24H 


Urinary Catheter Assessment [RC] ASDIRECTED 





01/06/20 12:57


EKG Documentation Completion [RC] STAT 





01/06/20 12:58


Blood Glucose Check, Bedside [RC] ONETIME 


CULTURE BLOOD [BC] Stat 


CULTURE BLOOD [BC] Stat 


Blood Culture x2 Reflex Set [OM.PC] Stat 





01/06/20 14:37


Insulin Regular, Human [HumuLIN R] 100 unit   Sodium Chloride 0.9% [Normal 

Saline] 99 ml IV TITRATE 














- Assessment/Plan


Last 24 Hours: 


My Active Orders





01/06/20 12:30


Insert Patterson Catheter [Insert Urinary Catheter] [OM.PC] Q24H 


Urinary Catheter Assessment [RC] ASDIRECTED 





01/06/20 12:57


EKG Documentation Completion [RC] STAT 





01/06/20 12:58


Blood Glucose Check, Bedside [RC] ONETIME 


CULTURE BLOOD [BC] Stat 


CULTURE BLOOD [BC] Stat 


Blood Culture x2 Reflex Set [OM.PC] Stat 





01/06/20 14:37


Insulin Regular, Human [HumuLIN R] 100 unit   Sodium Chloride 0.9% [Normal 

Saline] 99 ml IV TITRATE

## 2020-01-06 NOTE — CT
Head CT

 

Technique: Multiple axial sections through the brain were obtained.  

Intravenous contrast was not utilized.

 

Comparison: No prior intracranial imaging.

 

Findings: Ventricles along with basal cisterns and sulci over the 

convexities are mildly prominent.  No abnormal parenchymal densities 

are seen.  No evidence of intracranial hemorrhage.  No midline shift 

or mass effect is appreciated.

 

Bone window settings were reviewed which show the visualized paranasal

 sinuses to appear clear.  Visualized mastoid sinuses are also clear. 

 No acute calvarial abnormality is appreciated.

 

Impression:

1.  Minimal senescent change.

2.  Nothing acute is appreciated on noncontrast head CT exam.

 

Diagnostic code #1

 

This report was dictated in [place] Standard Time

## 2020-01-06 NOTE — CR
Chest: Portable view of the chest was obtained.

 

Comparison: Prior chest x-ray of 10/24/19.

 

Heart size is normal.  Upper mediastinum is normal.  Lungs are clear 

with no acute parenchymal change.  Bony structures are grossly intact.

 

Impression:

1.  Nothing acute is appreciated on portable chest x-ray.

 

Diagnostic code #1

 

This report was dictated in [place] Standard Time

## 2020-01-06 NOTE — PCM.HP.2
H&P History of Present Illness





- General


Date of Service: 01/06/20


Admit Problem/Dx: 


 Admission Diagnosis/Problem





Admission Diagnosis/Problem      Diabetic ketoacidosis








Source of Information: Patient, Provider, RN Notes Reviewed, Significant Other


History Limitations: Reports: Altered Mental Status





- History of Present Illness


Initial Comments - Free Text/Narative: 


This is a 65 yo white female with past medical hx/o Impaired Vision, HLD, COPD, 

Hypothyroidism, Urinary Incontinence, Recurrent UTI, Hx/o Pyelonephritis, 

Fibromyalgia, Seizure, Schizophrenia/Schizoaffective Disorder, Muscle Spasm, 

Anxiety, Depression, and Obesity who was brought in by a concerned neighbor due 

to feeling ill for the past 2 weeks. Her symptoms gradually worsened in the 

past couple of days. States she has been having abdominal pain associated with 

nausea, vomiting, and diarrhea. She also has been extremely thirsty and report 

increased fluid intake. Her appetite was poor due to GI symptoms. Additionally, 

she had tremors on her right arm when her neighbors checked on her today. HPI 

is incomplete with patient being sedated/lethargic.





Her initial work up in ED shows a CBC remarkable for WBC of 14.86, RBC of 5.92, 

Hgb of 16.4, Hct of 49.6, MPV of 13.2, Neutrophils of 84.6%, Lymphocytes of 7.9%

, Neutrophil # of 12.57, Lymphocyte # of 1.17, and Monocyte # of 1.07. Her 

coagulation studies show PT of 13.9 and INR of 1.29. Her ABG shows pH of 7.4, 

pCO2 of 38.5, HCO3 of 17.3, and O2 Sat of 94.4% on RA. Her Chemistry is 

significant for Cl of 95, AG of 31.8, BUN of 28, Cr of 1.5, BS of 677, Serum 

Osm of 351, LA of 2.7, Phos of 4.8, AST of 60, ALT of 70, Alk Phos of 257, 

ProBNP of 170, Total Protein of 8.5, and Albumin of 3.1. Her A1C is 10.90 with 

Ketones of 14.52. Her UA is not suggestive of UTI. Her chest x-ray and head CT 

scan reports read as nothing acute is appreciated. Patient received initial 

treatment in ED before coming to the unit for further management of DKA.


  ** Left Abdominal


Pain Score (Numeric/FACES): 10





- Related Data


Allergies/Adverse Reactions: 


 Allergies











Allergy/AdvReac Type Severity Reaction Status Date / Time


 


acetaminophen Allergy  Cannot Verified 10/24/19 11:12





[From Tylenol-Codeine #3]   Remember  


 


codeine Allergy  Cannot Verified 10/24/19 11:12





[From Tylenol-Codeine #3]   Remember  


 


morphine AdvReac  Headache Verified 01/06/20 14:43











Home Medications: 


 Home Meds





Cyclobenzaprine [Flexeril] 10 mg PO TID PRN 12/28/16 [History]


Pregabalin [Lyrica] 300 mg PO BID 12/28/16 [History]


ARIPiprazole [Abilify] 10 mg PO DAILY 10/18/19 [History]


Dulaglutide [Trulicity] 1.5 mg SQ TU 10/24/19 [History]


ARIPiprazole [Aripiprazole] 40 mg PO DAILY 01/06/20 [History]


Fluticasone/Vilanterol [Breo Ellipta 100-25 MCG Inhalation Kit] 1 each IH 

ASDIRECTED 01/06/20 [History]


Rosuvastatin Calcium 20 mg PO BEDTIME 01/06/20 [History]


ALPRAZolam [Alprazolam] 0.25 mg PO BID PRN 01/07/20 [History]


FLUoxetine HCl [Fluoxetine HCl] 80 mg PO DAILY 01/07/20 [History]


Paliperidone [Invega] 6 mg PO DAILY 01/07/20 [History]


hydrOXYzine HCL [Hydroxyzine HCl] 25 mg PO TID PRN 01/07/20 [History]


metFORMIN HCl [Metformin HCl] 1,000 mg PO BID 01/07/20 [History]











Past Medical History


HEENT History: Reports: Impaired Vision


Other HEENT History: wears eyeglasses, has no teeth, frequent abscesses to "nubs

" of teeth that are still in place.


Cardiovascular History: Reports: High Cholesterol


Respiratory History: Reports: COPD


Genitourinary History: Reports: Pyelonephritis, Urinary Incontinence, UTI, 

Recurrent


OB/GYN History: Reports: Pregnancy


Musculoskeletal History: Reports: Fracture, Fibromyalgia


Neurological History: Reports: Seizure


Psychiatric History: Reports: Anxiety, Depression, PTSD, Schizophrenia


Endocrine/Metabolic History: Reports: Diabetes, Type II, Hypothyroidism





- Infectious Disease History


Infectious Disease History: Reports: Chicken Pox, Hepatitis C, Measles, Mumps, 

Scarlet Fever





- Past Surgical History


GI Surgical History: Reports: Appendectomy, Cholecystectomy


Musculoskeletal Surgical History: Reports: ORIF





Social & Family History





- Tobacco Use


Smoking Status *Q: Never Smoker


Second Hand Smoke Exposure: No





- Caffeine Use


Caffeine Use: Reports: Coffee, Soda





- Recreational Drug Use


Recreational Drug Use: No





- Living Situation & Occupation


Living situation: Reports: Single


Occupation: Disabled





H&P Review of Systems





- Review of Systems:


Review Of Systems: See Below


General: Reports: Malaise, Weakness, Fatigue, Decreased Appetite, Other (

increased thirst)


HEENT: Reports: No Symptoms


Pulmonary: Denies: Shortness of Breath


Cardiovascular: Denies: Chest Pain, Dyspnea on Exertion, Lightheadedness


Gastrointestinal: Reports: Abdominal Pain, Diarrhea, Nausea, Vomiting


Genitourinary: Reports: Frequency


Musculoskeletal: Reports: No Symptoms


Skin: Reports: No Symptoms


Psychiatric: Denies: Anxiety, Hallucinations


Neurological: Reports: Gait Disturbance.  Denies: Numbness, Seizure, Syncope, 

Tingling, Trouble Speaking


Hematologic/Lymphatic: Reports: No Symptoms


Immunologic: Reports: No Symptoms


Review of Systems Comment:: 


Sedated/lethargic





Exam





- Exam


Exam: See Below





- Vital Signs


Vital Signs: 


 Last Vital Signs











Temp  36.4 C   01/06/20 11:50


 


Pulse  122 H  01/06/20 11:50


 


Resp  20   01/06/20 11:50


 


BP  137/81   01/06/20 11:50


 


Pulse Ox  93 L  01/06/20 11:50











Weight: 107.048 kg





- Exam


General: Cooperative, Sedated, Other (Obese)


HEENT: Conjunctiva Clear, EACs Clear, Hearing Intact, Mucosa Moist & Pink, 

Nares Patent, Normal Nasal Septum, Posterior Pharynx Clear, Pupils Equal


Neck: Supple, Trachea Midline, Full Range of Motion, Other (neck short and thick

)


Lungs: Clear to Auscultation, Normal Respiratory Effort, Decreased Breath Sounds


Cardiovascular: Regular Rate, Regular Rhythm


GI/Abdominal Exam: Normal Bowel Sounds, Soft, Non-Tender, No Organomegaly, No 

Distention, No Abnormal Bruit, No Mass, Other (Obese)


 (Female) Exam: Deferred


Rectal (Female) Exam: Deferred


Back Exam: Normal Inspection, Decreased Range of Motion


Extremities: Normal Inspection, Normal Range of Motion, Non-Tender, No Pedal 

Edema, Normal Capillary Refill


Peripheral Pulses: 2+: Dorsalis Pedis (L), Dorsalis Pedis (R)


Skin: Warm, Dry, Intact


Neuro Extensive - Mental Status: Normal Mood/Affect, Other (sedated about 

arousable to verbal calls)


Neuro Extensive - Motor, Sensory, Reflexes: Abnormal Gait, Other (not 

appropriate for complete neurological exam)


Psychiatric: Normal Affect, Normal Mood.  No: Withdrawal Symptoms


Physical Exam Comments:: 


arousable and able to talk








- Patient Data


Lab Results Last 24 hrs: 


 Laboratory Results - last 24 hr











  01/06/20 01/06/20 01/06/20 Range/Units





  12:40 12:40 12:41 


 


WBC    14.86 H  (3.98-10.04)  K/mm3


 


RBC    5.92 H  (3.98-5.22)  M/mm3


 


Hgb    16.4 H D  (11.2-15.7)  gm/dl


 


Hct    49.6 H  (34.1-44.9)  %


 


MCV    83.8  D  (79.4-94.8)  fl


 


MCH    27.7  (25.6-32.2)  pg


 


MCHC    33.1  (32.2-35.5)  g/dl


 


RDW Std Deviation    43.9  (36.4-46.3)  fL


 


Plt Count    308  D  (182-369)  K/mm3


 


MPV    13.2 H  (9.4-12.3)  fl


 


Neut % (Auto)    84.6 H  (34.0-71.1)  %


 


Lymph % (Auto)    7.9 L  (19.3-51.7)  %


 


Mono % (Auto)    7.2  (4.7-12.5)  %


 


Eos % (Auto)    0 L  (0.7-5.8)  


 


Baso % (Auto)    0.2  (0.1-1.2)  %


 


Neut # (Auto)    12.57 H  (1.56-6.13)  K/mm3


 


Lymph # (Auto)    1.17 L  (1.18-3.74)  K/mm3


 


Mono # (Auto)    1.07 H  (0.24-0.36)  K/mm3


 


Eos # (Auto)    0.00 L  (0.04-0.36)  K/mm3


 


Baso # (Auto)    0.03  (0.01-0.08)  K/mm3


 


Neutrophils % (Manual)     (40-60)  %


 


Band Neutrophils %     (0-10)  %


 


Lymphocytes % (Manual)     (20-40)  %


 


Atypical Lymphs %     %


 


Monocytes % (Manual)     (2-10)  %


 


Eosinophils % (Manual)     (0.7-5.8)  %


 


Basophils % (Manual)     (0.1-1.2)  


 


Manual Slide Review    Abnormal smear  


 


Platelet Estimate     


 


Anisocytosis     


 


RBC Morph Comment     


 


ESR     (0-20)  mm/hr


 


PT     (9.7-12.0)  SECONDS


 


INR     


 


Puncture Site     


 


ABG pH     (7.35-7.45)  


 


ABG pCO2     (35.0-45.0)  mmHg


 


ABG HCO3     (22.0-26.0)  meq/L


 


ABG O2 Saturation     (96.0-97.0)  %


 


ABG Base Excess     (-2-2.0)  


 


Yuri Test     


 


O2 Delivery Device     


 


FiO2     (21..00)  %


 


Sodium  145    (136-145)  mEq/L


 


Potassium  3.8    (3.5-5.1)  mEq/L


 


Chloride  95 L    ()  mEq/L


 


Carbon Dioxide  22    (21-32)  mEq/L


 


Anion Gap  31.8 H    (5-15)  


 


BUN  28 H    (7-18)  mg/dL


 


Creatinine  1.5 H    (0.55-1.02)  mg/dL


 


Est Cr Clr Drug Dosing  34.99    mL/min


 


Estimated GFR (MDRD)  35    (>60)  mL/min


 


BUN/Creatinine Ratio  18.7 H    (14-18)  


 


Glucose  677 H*    ()  mg/dL


 


Hemoglobin A1c     (4.50-6.20)  %


 


Serum Osmolality     (280-300)  mosm/kg


 


Lactic Acid     (0.4-2.0)  mmol/L


 


Calcium  9.5    (8.5-10.1)  mg/dL


 


Phosphorus     (2.6-4.7)  mg/dL


 


Magnesium     (1.8-2.4)  mg/dl


 


Total Bilirubin  1.0    (0.2-1.0)  mg/dL


 


AST  60 H    (15-37)  U/L


 


ALT  70 H    (14-59)  U/L


 


Alkaline Phosphatase  257 H    ()  U/L


 


Troponin I     (0.00-0.056)  ng/mL


 


C-Reactive Protein     (<1.0)  mg/dL


 


NT-Pro-B Natriuret Pep   170 H   (0-125)  pg/mL


 


Total Protein  8.5 H    (6.4-8.2)  g/dl


 


Albumin  3.1 L    (3.4-5.0)  g/dl


 


Globulin  5.4    gm/dL


 


Albumin/Globulin Ratio  0.6 L    (1-2)  


 


TSH 3rd Generation     (0.358-3.74)  uIU/mL


 


Urine Color     (Yellow)  


 


Urine Appearance     (Clear)  


 


Urine pH     (5.0-8.0)  


 


Ur Specific Gravity     (1.005-1.030)  


 


Urine Protein     (Negative)  


 


Urine Glucose (UA)     (Negative)  


 


Urine Ketones     (Negative)  


 


Urine Occult Blood     (Negative)  


 


Urine Nitrite     (Negative)  


 


Urine Bilirubin     (Negative)  


 


Urine Urobilinogen     (0.2-1.0)  


 


Ur Leukocyte Esterase     (Negative)  


 


Urine RBC     (0-5)  /hpf


 


Urine WBC     (0-5)  /hpf


 


Ur Squamous Epith Cells     (0-5)  /hpf


 


Urine Bacteria     (FEW)  /hpf


 


Hyaline Casts     (0-5)  /lpf


 


Urine Mucus     (FEW)  /hpf


 


Ketones     (0.0-0.3)  mM














  01/06/20 01/06/20 01/06/20 Range/Units





  12:50 13:01 14:10 


 


WBC    15.38 H  (3.98-10.04)  K/mm3


 


RBC    5.94 H  (3.98-5.22)  M/mm3


 


Hgb    16.6 H  (11.2-15.7)  gm/dl


 


Hct    49.9 H  (34.1-44.9)  %


 


MCV    84.0  (79.4-94.8)  fl


 


MCH    27.9  (25.6-32.2)  pg


 


MCHC    33.3  (32.2-35.5)  g/dl


 


RDW Std Deviation    44.4  (36.4-46.3)  fL


 


Plt Count    228  D  (182-369)  K/mm3


 


MPV    12.7 H  (9.4-12.3)  fl


 


Neut % (Auto)     (34.0-71.1)  %


 


Lymph % (Auto)     (19.3-51.7)  %


 


Mono % (Auto)     (4.7-12.5)  %


 


Eos % (Auto)     (0.7-5.8)  


 


Baso % (Auto)     (0.1-1.2)  %


 


Neut # (Auto)     (1.56-6.13)  K/mm3


 


Lymph # (Auto)     (1.18-3.74)  K/mm3


 


Mono # (Auto)     (0.24-0.36)  K/mm3


 


Eos # (Auto)     (0.04-0.36)  K/mm3


 


Baso # (Auto)     (0.01-0.08)  K/mm3


 


Neutrophils % (Manual)    84 H  (40-60)  %


 


Band Neutrophils %    0  (0-10)  %


 


Lymphocytes % (Manual)    8 L  (20-40)  %


 


Atypical Lymphs %    0  %


 


Monocytes % (Manual)    7  (2-10)  %


 


Eosinophils % (Manual)    1  (0.7-5.8)  %


 


Basophils % (Manual)    0 L  (0.1-1.2)  


 


Manual Slide Review     


 


Platelet Estimate    Adequate  


 


Anisocytosis    1+ slight  


 


RBC Morph Comment    Not Reportable  


 


ESR     (0-20)  mm/hr


 


PT     (9.7-12.0)  SECONDS


 


INR     


 


Puncture Site   Rt radial   


 


ABG pH   7.40   (7.35-7.45)  


 


ABG pCO2   28.5 L   (35.0-45.0)  mmHg


 


ABG HCO3   17.3 L   (22.0-26.0)  meq/L


 


ABG O2 Saturation   94.4 L   (96.0-97.0)  %


 


ABG Base Excess   -5.7 L   (-2-2.0)  


 


Yuri Test   Positive   


 


O2 Delivery Device   Room air   


 


FiO2   0.00 L   (21..00)  %


 


Sodium     (136-145)  mEq/L


 


Potassium     (3.5-5.1)  mEq/L


 


Chloride     ()  mEq/L


 


Carbon Dioxide     (21-32)  mEq/L


 


Anion Gap     (5-15)  


 


BUN     (7-18)  mg/dL


 


Creatinine     (0.55-1.02)  mg/dL


 


Est Cr Clr Drug Dosing     mL/min


 


Estimated GFR (MDRD)     (>60)  mL/min


 


BUN/Creatinine Ratio     (14-18)  


 


Glucose     ()  mg/dL


 


Hemoglobin A1c     (4.50-6.20)  %


 


Serum Osmolality     (280-300)  mosm/kg


 


Lactic Acid     (0.4-2.0)  mmol/L


 


Calcium     (8.5-10.1)  mg/dL


 


Phosphorus     (2.6-4.7)  mg/dL


 


Magnesium     (1.8-2.4)  mg/dl


 


Total Bilirubin     (0.2-1.0)  mg/dL


 


AST     (15-37)  U/L


 


ALT     (14-59)  U/L


 


Alkaline Phosphatase     ()  U/L


 


Troponin I     (0.00-0.056)  ng/mL


 


C-Reactive Protein     (<1.0)  mg/dL


 


NT-Pro-B Natriuret Pep     (0-125)  pg/mL


 


Total Protein     (6.4-8.2)  g/dl


 


Albumin     (3.4-5.0)  g/dl


 


Globulin     gm/dL


 


Albumin/Globulin Ratio     (1-2)  


 


TSH 3rd Generation     (0.358-3.74)  uIU/mL


 


Urine Color  Yellow    (Yellow)  


 


Urine Appearance  Clear    (Clear)  


 


Urine pH  6.0    (5.0-8.0)  


 


Ur Specific Gravity  1.020    (1.005-1.030)  


 


Urine Protein  1+ H    (Negative)  


 


Urine Glucose (UA)  2+ H    (Negative)  


 


Urine Ketones  2+ H    (Negative)  


 


Urine Occult Blood  2+ H    (Negative)  


 


Urine Nitrite  Negative    (Negative)  


 


Urine Bilirubin  1+ H    (Negative)  


 


Urine Urobilinogen  0.2    (0.2-1.0)  


 


Ur Leukocyte Esterase  Negative    (Negative)  


 


Urine RBC  10-20 H    (0-5)  /hpf


 


Urine WBC  0-5    (0-5)  /hpf


 


Ur Squamous Epith Cells  0-5    (0-5)  /hpf


 


Urine Bacteria  Many H    (FEW)  /hpf


 


Hyaline Casts  0-5    (0-5)  /lpf


 


Urine Mucus  Few    (FEW)  /hpf


 


Ketones     (0.0-0.3)  mM














  01/06/20 01/06/20 01/06/20 Range/Units





  14:10 14:10 14:10 


 


WBC     (3.98-10.04)  K/mm3


 


RBC     (3.98-5.22)  M/mm3


 


Hgb     (11.2-15.7)  gm/dl


 


Hct     (34.1-44.9)  %


 


MCV     (79.4-94.8)  fl


 


MCH     (25.6-32.2)  pg


 


MCHC     (32.2-35.5)  g/dl


 


RDW Std Deviation     (36.4-46.3)  fL


 


Plt Count     (182-369)  K/mm3


 


MPV     (9.4-12.3)  fl


 


Neut % (Auto)     (34.0-71.1)  %


 


Lymph % (Auto)     (19.3-51.7)  %


 


Mono % (Auto)     (4.7-12.5)  %


 


Eos % (Auto)     (0.7-5.8)  


 


Baso % (Auto)     (0.1-1.2)  %


 


Neut # (Auto)     (1.56-6.13)  K/mm3


 


Lymph # (Auto)     (1.18-3.74)  K/mm3


 


Mono # (Auto)     (0.24-0.36)  K/mm3


 


Eos # (Auto)     (0.04-0.36)  K/mm3


 


Baso # (Auto)     (0.01-0.08)  K/mm3


 


Neutrophils % (Manual)     (40-60)  %


 


Band Neutrophils %     (0-10)  %


 


Lymphocytes % (Manual)     (20-40)  %


 


Atypical Lymphs %     %


 


Monocytes % (Manual)     (2-10)  %


 


Eosinophils % (Manual)     (0.7-5.8)  %


 


Basophils % (Manual)     (0.1-1.2)  


 


Manual Slide Review     


 


Platelet Estimate     


 


Anisocytosis     


 


RBC Morph Comment     


 


ESR     (0-20)  mm/hr


 


PT  13.9 H    (9.7-12.0)  SECONDS


 


INR  1.29    


 


Puncture Site     


 


ABG pH     (7.35-7.45)  


 


ABG pCO2     (35.0-45.0)  mmHg


 


ABG HCO3     (22.0-26.0)  meq/L


 


ABG O2 Saturation     (96.0-97.0)  %


 


ABG Base Excess     (-2-2.0)  


 


Yuri Test     


 


O2 Delivery Device     


 


FiO2     (21..00)  %


 


Sodium     (136-145)  mEq/L


 


Potassium     (3.5-5.1)  mEq/L


 


Chloride     ()  mEq/L


 


Carbon Dioxide     (21-32)  mEq/L


 


Anion Gap     (5-15)  


 


BUN     (7-18)  mg/dL


 


Creatinine     (0.55-1.02)  mg/dL


 


Est Cr Clr Drug Dosing     mL/min


 


Estimated GFR (MDRD)     (>60)  mL/min


 


BUN/Creatinine Ratio     (14-18)  


 


Glucose     ()  mg/dL


 


Hemoglobin A1c     (4.50-6.20)  %


 


Serum Osmolality   351 H   (280-300)  mosm/kg


 


Lactic Acid    2.7 H*  (0.4-2.0)  mmol/L


 


Calcium     (8.5-10.1)  mg/dL


 


Phosphorus   4.8 H   (2.6-4.7)  mg/dL


 


Magnesium   2.3   (1.8-2.4)  mg/dl


 


Total Bilirubin     (0.2-1.0)  mg/dL


 


AST     (15-37)  U/L


 


ALT     (14-59)  U/L


 


Alkaline Phosphatase     ()  U/L


 


Troponin I   < 0.017   (0.00-0.056)  ng/mL


 


C-Reactive Protein   1.7 H*   (<1.0)  mg/dL


 


NT-Pro-B Natriuret Pep     (0-125)  pg/mL


 


Total Protein     (6.4-8.2)  g/dl


 


Albumin     (3.4-5.0)  g/dl


 


Globulin     gm/dL


 


Albumin/Globulin Ratio     (1-2)  


 


TSH 3rd Generation   1.097   (0.358-3.74)  uIU/mL


 


Urine Color     (Yellow)  


 


Urine Appearance     (Clear)  


 


Urine pH     (5.0-8.0)  


 


Ur Specific Gravity     (1.005-1.030)  


 


Urine Protein     (Negative)  


 


Urine Glucose (UA)     (Negative)  


 


Urine Ketones     (Negative)  


 


Urine Occult Blood     (Negative)  


 


Urine Nitrite     (Negative)  


 


Urine Bilirubin     (Negative)  


 


Urine Urobilinogen     (0.2-1.0)  


 


Ur Leukocyte Esterase     (Negative)  


 


Urine RBC     (0-5)  /hpf


 


Urine WBC     (0-5)  /hpf


 


Ur Squamous Epith Cells     (0-5)  /hpf


 


Urine Bacteria     (FEW)  /hpf


 


Hyaline Casts     (0-5)  /lpf


 


Urine Mucus     (FEW)  /hpf


 


Ketones     (0.0-0.3)  mM














  01/06/20 01/06/20 01/06/20 Range/Units





  14:10 14:10 14:45 


 


WBC     (3.98-10.04)  K/mm3


 


RBC     (3.98-5.22)  M/mm3


 


Hgb     (11.2-15.7)  gm/dl


 


Hct     (34.1-44.9)  %


 


MCV     (79.4-94.8)  fl


 


MCH     (25.6-32.2)  pg


 


MCHC     (32.2-35.5)  g/dl


 


RDW Std Deviation     (36.4-46.3)  fL


 


Plt Count     (182-369)  K/mm3


 


MPV     (9.4-12.3)  fl


 


Neut % (Auto)     (34.0-71.1)  %


 


Lymph % (Auto)     (19.3-51.7)  %


 


Mono % (Auto)     (4.7-12.5)  %


 


Eos % (Auto)     (0.7-5.8)  


 


Baso % (Auto)     (0.1-1.2)  %


 


Neut # (Auto)     (1.56-6.13)  K/mm3


 


Lymph # (Auto)     (1.18-3.74)  K/mm3


 


Mono # (Auto)     (0.24-0.36)  K/mm3


 


Eos # (Auto)     (0.04-0.36)  K/mm3


 


Baso # (Auto)     (0.01-0.08)  K/mm3


 


Neutrophils % (Manual)     (40-60)  %


 


Band Neutrophils %     (0-10)  %


 


Lymphocytes % (Manual)     (20-40)  %


 


Atypical Lymphs %     %


 


Monocytes % (Manual)     (2-10)  %


 


Eosinophils % (Manual)     (0.7-5.8)  %


 


Basophils % (Manual)     (0.1-1.2)  


 


Manual Slide Review     


 


Platelet Estimate     


 


Anisocytosis     


 


RBC Morph Comment     


 


ESR    20  (0-20)  mm/hr


 


PT     (9.7-12.0)  SECONDS


 


INR     


 


Puncture Site     


 


ABG pH     (7.35-7.45)  


 


ABG pCO2     (35.0-45.0)  mmHg


 


ABG HCO3     (22.0-26.0)  meq/L


 


ABG O2 Saturation     (96.0-97.0)  %


 


ABG Base Excess     (-2-2.0)  


 


Yuri Test     


 


O2 Delivery Device     


 


FiO2     (21..00)  %


 


Sodium     (136-145)  mEq/L


 


Potassium     (3.5-5.1)  mEq/L


 


Chloride     ()  mEq/L


 


Carbon Dioxide     (21-32)  mEq/L


 


Anion Gap     (5-15)  


 


BUN     (7-18)  mg/dL


 


Creatinine     (0.55-1.02)  mg/dL


 


Est Cr Clr Drug Dosing     mL/min


 


Estimated GFR (MDRD)     (>60)  mL/min


 


BUN/Creatinine Ratio     (14-18)  


 


Glucose     ()  mg/dL


 


Hemoglobin A1c   10.90 H   (4.50-6.20)  %


 


Serum Osmolality     (280-300)  mosm/kg


 


Lactic Acid     (0.4-2.0)  mmol/L


 


Calcium     (8.5-10.1)  mg/dL


 


Phosphorus     (2.6-4.7)  mg/dL


 


Magnesium     (1.8-2.4)  mg/dl


 


Total Bilirubin     (0.2-1.0)  mg/dL


 


AST     (15-37)  U/L


 


ALT     (14-59)  U/L


 


Alkaline Phosphatase     ()  U/L


 


Troponin I     (0.00-0.056)  ng/mL


 


C-Reactive Protein     (<1.0)  mg/dL


 


NT-Pro-B Natriuret Pep     (0-125)  pg/mL


 


Total Protein     (6.4-8.2)  g/dl


 


Albumin     (3.4-5.0)  g/dl


 


Globulin     gm/dL


 


Albumin/Globulin Ratio     (1-2)  


 


TSH 3rd Generation     (0.358-3.74)  uIU/mL


 


Urine Color     (Yellow)  


 


Urine Appearance     (Clear)  


 


Urine pH     (5.0-8.0)  


 


Ur Specific Gravity     (1.005-1.030)  


 


Urine Protein     (Negative)  


 


Urine Glucose (UA)     (Negative)  


 


Urine Ketones     (Negative)  


 


Urine Occult Blood     (Negative)  


 


Urine Nitrite     (Negative)  


 


Urine Bilirubin     (Negative)  


 


Urine Urobilinogen     (0.2-1.0)  


 


Ur Leukocyte Esterase     (Negative)  


 


Urine RBC     (0-5)  /hpf


 


Urine WBC     (0-5)  /hpf


 


Ur Squamous Epith Cells     (0-5)  /hpf


 


Urine Bacteria     (FEW)  /hpf


 


Hyaline Casts     (0-5)  /lpf


 


Urine Mucus     (FEW)  /hpf


 


Ketones  14.52    (0.0-0.3)  mM











Result Diagrams: 


 01/07/20 05:20





 01/07/20 05:20





Sepsis Event Note





- Evaluation


Sepsis Screening Result: No Definite Risk





- Focused Exam


Vital Signs: 


 Vital Signs











  Temp Pulse Resp BP Pulse Ox


 


 01/06/20 11:50  36.4 C  122 H  20  137/81  93 L











Date Exam was Performed: 01/07/20


Time Exam was Performed: 16:04


Problem List Initiated/Reviewed/Updated: Yes


Orders Last 24hrs: 


 Active Orders 24 hr











 Category Date Time Status


 


 Admission Status [Patient Status] [ADT] Routine ADT  01/06/20 14:44 Active


 


 Blood Glucose Check, Bedside [RC] ONETIME Care  01/06/20 12:58 Active


 


 EKG Documentation Completion [RC] STAT Care  01/06/20 12:57 Active


 


 Insert Patterson Catheter [Insert Urinary Catheter] [OM.PC] Care  01/06/20 12:30 

Ordered





 Q24H   


 


 Urinary Catheter Assessment [RC] ASDIRECTED Care  01/06/20 12:30 Active


 


 CULTURE BLOOD [BC] Stat Lab  01/06/20 12:58 Ordered


 


 CULTURE BLOOD [BC] Stat Lab  01/06/20 12:58 Ordered


 


 LACTIC ACID [CHEM] Stat Lab  01/06/20 17:00 Ordered


 


 Insulin Regular, Human [HumuLIN R] 100 unit Med  01/06/20 14:37 Active





 Sodium Chloride 0.9% [Normal Saline] 99 ml   





 IV TITRATE   


 


 Potassium Chloride [KCl 10 MEQ in Water 100 ML] 10 meq Med  01/06/20 15:15 

Active





 Premix Bag 1 bag   





 IV Q1H   


 


 Sodium Chloride 0.9% [Normal Saline] 1,000 ml Med  01/06/20 13:00 Active





 IV ASDIRECTED   


 


 Sodium Chloride 0.9% [Normal Saline] 1,000 ml Med  01/06/20 15:15 Active





 IV ASDIRECTED   


 


 Blood Culture x2 Reflex Set [OM.PC] Stat Oth  01/06/20 12:58 Ordered








 Medication Orders





Sodium Chloride (Normal Saline)  1,000 mls @ 999 mls/hr IV ASDIRECTED DYLON


   Last Admin: 01/06/20 13:20  Dose: 999 mls/hr


Insulin Human Regular 100 unit (/ Sodium Chloride)  100 mls @ 6 mls/hr IV 

TITRATE DYLON; Protocol


   Last Admin: 01/06/20 14:56  Dose: 6 unit/hr, 6 mls/hr


Sodium Chloride (Normal Saline)  1,000 mls @ 999 mls/hr IV ASDIRECTED DYLON


   Last Admin: 01/06/20 15:30  Dose: 999 mls/hr


Potassium Chloride 10 meq/ (Premix)  100 mls @ 100 mls/hr IV Q1H DYLON


   Stop: 01/06/20 18:14


   Last Admin: 01/06/20 15:35  Dose: 100 mls/hr








Assessment/Plan Comment:: 





Acute:


AMS 2/2 Metabolic Encephalopathy from DKA. Head CT scan shows no acute 

abnormality. Plan: Supportive care and Aspiration precaution





DKA. Presented lethargic with BS of 677, LA os 2.7 and Ketones of 14.52. 

Received fluids for initial volume resuscitation and insulin drip in ED prior 

to coming to the unit. Plan: Dr. Larose's DKA protocol





DM2 with A1C of 10.90. She takes Metformin and Trulicity for maintenance 

medications. Hold home medications for now.





Leukocytosis with WBC of 14.86 and 15.38 with 0 Band. Suspected secondary to 

hemo-concentration.





ZINA 2/2 Intravascular Volume Depletion from GI/ loss. Bun of 28 with Cr of 

1.5. No baseline for comparison. Avoid nephrotoxic agents. Serial BMP.





Lactic Acidosis. LA of 2.7 likely from Metabolic Acidosis due to DKA. 





Hyperphosphatemia. PO4 of 4.8. 2/2 ZINA. Will monitor.


  


Transaminitis. AST of 60 and ALT of 70. Likely from severe dehydration. Will 

hold statin and acetaminophen for now.





Hypoalbuminemia. Albumin of 3.1.





Obesity Class II. BMI of 39.3. Dietary consult for weight management.





Proteinuria +1 Protein, Glucosuria 2+ Glucose, Ketonuria 2+ Ketones, 

Bilirubinuria, Bacteriuria, and Hematuria 2+ Occult Blood-abnormal finding on 

UA.





Chronic: Impaired Vision, HLD, COPD, Hypothyroidism, Urinary Incontinence, 

Recurrent UTI, Hx/o Pyelonephritis, Fibromyalgia, Seizure, Schizophrenia/

Schizoaffective Disorder, Muscle Spasm, Anxiety, Depression, and Obesity





Plan: Admit to the unit. Dr. Larose's DKA protocol. Continue volume 

resuscitation. NPO for now. Aspiration and Fall Precautions. Code status is 

full. Prognosis is guarded.

## 2020-01-07 LAB — HBA1C BLD-MCNC: 13.1 % (ref 4.5–6.2)

## 2020-01-07 RX ADMIN — HEPARIN SODIUM SCH UNITS: 5000 INJECTION, SOLUTION INTRAVENOUS; SUBCUTANEOUS at 02:31

## 2020-01-07 RX ADMIN — HEPARIN SODIUM SCH: 5000 INJECTION, SOLUTION INTRAVENOUS; SUBCUTANEOUS at 12:38

## 2020-01-07 RX ADMIN — NYSTATIN SCH APPLIC: 100000 POWDER TOPICAL at 01:43

## 2020-01-07 RX ADMIN — MOMETASONE FUROATE AND FORMOTEROL FUMARATE DIHYDRATE SCH PUFF: 100; 5 AEROSOL RESPIRATORY (INHALATION) at 20:29

## 2020-01-07 RX ADMIN — NYSTATIN SCH APPLIC: 100000 POWDER TOPICAL at 16:10

## 2020-01-07 RX ADMIN — MOMETASONE FUROATE AND FORMOTEROL FUMARATE DIHYDRATE SCH PUFF: 100; 5 AEROSOL RESPIRATORY (INHALATION) at 11:40

## 2020-01-07 RX ADMIN — DIBASIC SODIUM PHOSPHATE, MONOBASIC POTASSIUM PHOSPHATE AND MONOBASIC SODIUM PHOSPHATE SCH MG: 852; 155; 130 TABLET ORAL at 20:55

## 2020-01-07 RX ADMIN — HEPARIN SODIUM SCH UNITS: 5000 INJECTION, SOLUTION INTRAVENOUS; SUBCUTANEOUS at 18:14

## 2020-01-07 RX ADMIN — NYSTATIN SCH APPLIC: 100000 POWDER TOPICAL at 21:09

## 2020-01-07 RX ADMIN — GUAIFENESIN AND DEXTROMETHORPHAN PRN ML: 100; 10 SYRUP ORAL at 00:02

## 2020-01-07 RX ADMIN — NYSTATIN SCH APPLIC: 100000 POWDER TOPICAL at 09:16

## 2020-01-07 NOTE — PCM.SN
- Free Text/Narrative


Note: 


Patient developed acute psychosis after morning rounds. She refused labs, 

ripped out her IV access as well as telemetry monitor. She wanted to leave 

despite having an elevated BS of >400. She was agitated and wanted to leave the 

facility. I informed her she just had a life threatening event and that she was 

not quite ready for discharge. Her neighbor/friend then came to speak with her. 

We tried everything we can to help her calm down but w/o any success. However 

she responded to a one time dose of Zyprexa 10 mg IM. 





At this point, we are concerned about her ability to care for herself since she 

does not have any family members for home support. Spoke to EMILY Mast and she 

will look into her home situation prior to discharge.

## 2020-01-08 RX ADMIN — GUAIFENESIN AND DEXTROMETHORPHAN PRN ML: 100; 10 SYRUP ORAL at 20:18

## 2020-01-08 RX ADMIN — MOMETASONE FUROATE AND FORMOTEROL FUMARATE DIHYDRATE SCH PUFF: 100; 5 AEROSOL RESPIRATORY (INHALATION) at 08:09

## 2020-01-08 RX ADMIN — NYSTATIN SCH APPLIC: 100000 POWDER TOPICAL at 16:00

## 2020-01-08 RX ADMIN — MOMETASONE FUROATE AND FORMOTEROL FUMARATE DIHYDRATE SCH PUFF: 100; 5 AEROSOL RESPIRATORY (INHALATION) at 20:41

## 2020-01-08 RX ADMIN — HEPARIN SODIUM SCH UNITS: 5000 INJECTION, SOLUTION INTRAVENOUS; SUBCUTANEOUS at 10:00

## 2020-01-08 RX ADMIN — HEPARIN SODIUM SCH UNITS: 5000 INJECTION, SOLUTION INTRAVENOUS; SUBCUTANEOUS at 02:22

## 2020-01-08 RX ADMIN — POTASSIUM CHLORIDE SCH MEQ: 1500 TABLET, EXTENDED RELEASE ORAL at 20:17

## 2020-01-08 RX ADMIN — DIBASIC SODIUM PHOSPHATE, MONOBASIC POTASSIUM PHOSPHATE AND MONOBASIC SODIUM PHOSPHATE SCH MG: 852; 155; 130 TABLET ORAL at 08:05

## 2020-01-08 RX ADMIN — HEPARIN SODIUM SCH UNITS: 5000 INJECTION, SOLUTION INTRAVENOUS; SUBCUTANEOUS at 17:27

## 2020-01-08 RX ADMIN — NYSTATIN SCH APPLIC: 100000 POWDER TOPICAL at 20:20

## 2020-01-08 RX ADMIN — NYSTATIN SCH APPLIC: 100000 POWDER TOPICAL at 08:06

## 2020-01-08 RX ADMIN — DIBASIC SODIUM PHOSPHATE, MONOBASIC POTASSIUM PHOSPHATE AND MONOBASIC SODIUM PHOSPHATE SCH MG: 852; 155; 130 TABLET ORAL at 20:17

## 2020-01-08 NOTE — US
Renal ultrasound: Multiple real-time images of the kidneys were 

obtained.

 

Kidney show no hydronephrosis or mass.  Resistivity indices are mostly

 within normal limits.  Right kidney length is 10.2 cm and left kidney

 length is 9.7 cm.  Prevoid bladder volume is 96 mL with patient 

refusing post void study.

 

Impression:

1.  No definite abnormality is seen on renal ultrasound.

 

Diagnostic code #2

 

This report was dictated in [place] Standard Time

## 2020-01-08 NOTE — PCM.PN
- General Info


Date of Service: 01/08/20


Admission Dx/Problem (Free Text): 


 Admission Diagnosis/Problem





Admission Diagnosis/Problem      Diabetic ketoacidosis








Subjective Update: 


Follow Up


Functional Status: Reports: Pain Controlled, Tolerating Diet, Ambulating, 

Urinating





- Review of Systems


General: Denies: Fever, Chills


HEENT: Reports: No Symptoms


Pulmonary: Denies: Shortness of Breath


Cardiovascular: Denies: Chest Pain, Dyspnea on Exertion, Lightheadedness


Gastrointestinal: Denies: Abdominal Pain, Nausea, Vomiting


Genitourinary: Reports: No Symptoms


Musculoskeletal: Reports: No Symptoms


Skin: Reports: No Symptoms


Neurological: Reports: No Symptoms


Psychiatric: Reports: No Symptoms


Systems Review Comment:: 


She mellowed out since yesterday. She ripped out her IV acces and removed her 

telemetry yesterday. She remains mad and did not understand why she is in the 

hospital.





- Patient Data


Vitals - Most Recent: 


 Last Vital Signs











Temp  36.9 C   01/08/20 04:00


 


Pulse  95   01/08/20 04:02


 


Resp  19   01/08/20 04:02


 


BP  93/53 L  01/08/20 04:00


 


Pulse Ox  98   01/08/20 04:02











Weight - Most Recent: 112.945 kg


I&O - Last 24 Hours: 


 Intake & Output











 01/07/20 01/08/20 01/08/20





 22:59 06:59 14:59


 


Intake Total 2270 400 


 


Balance 2270 400 











Lab Results Last 24 Hours: 


 Laboratory Results - last 24 hr











  01/06/20 01/06/20 01/06/20 Range/Units





  19:07 19:07 23:32 


 


WBC     (3.98-10.04)  K/mm3


 


RBC     (3.98-5.22)  M/mm3


 


Hgb     (11.2-15.7)  gm/dl


 


Hct     (34.1-44.9)  %


 


MCV     (79.4-94.8)  fl


 


MCH     (25.6-32.2)  pg


 


MCHC     (32.2-35.5)  g/dl


 


RDW Std Deviation     (36.4-46.3)  fL


 


Plt Count     (182-369)  K/mm3


 


MPV     (9.4-12.3)  fl


 


Neut % (Auto)     (34.0-71.1)  %


 


Lymph % (Auto)     (19.3-51.7)  %


 


Mono % (Auto)     (4.7-12.5)  %


 


Eos % (Auto)     (0.7-5.8)  


 


Baso % (Auto)     (0.1-1.2)  %


 


Neut # (Auto)     (1.56-6.13)  K/mm3


 


Lymph # (Auto)     (1.18-3.74)  K/mm3


 


Mono # (Auto)     (0.24-0.36)  K/mm3


 


Eos # (Auto)     (0.04-0.36)  K/mm3


 


Baso # (Auto)     (0.01-0.08)  K/mm3


 


Glucose     ()  mg/dL


 


POC Glucose     ()  mg/dL


 


Hemoglobin A1c     (4.50-6.20)  %


 


Phosphorus     (2.6-4.7)  mg/dL


 


Magnesium     (1.8-2.4)  mg/dl


 


Total Bilirubin     (0.2-1.0)  mg/dL


 


Direct Bilirubin     (0.0-0.2)  mg/dl


 


Indirect Bilirubin     


 


AST     (15-37)  U/L


 


ALT     (14-59)  U/L


 


Alkaline Phosphatase     ()  U/L


 


Total Protein     (6.4-8.2)  g/dl


 


Albumin     (3.4-5.0)  g/dl


 


Globulin     gm/dL


 


Albumin/Globulin Ratio     (1-2)  


 


Ur Eosinophil Smear  Eos absent    (Eos Absent)  % EOS


 


Ur Urea Nitrogen Conc   436   mg/dL


 


Adenovirus (PCR)    Not detected  (Not Detected)  


 


B. pertussis DNA (PCR)    Not detected  (Not Detected)  


 


B.parapertussis DNA PCR    Not detected  (Not Detected)  


 


C. pneumoniae DNA (PCR)    Not detected  (Not Detected)  


 


Coronavirus (PCR)    Not detected  (Not Detected)  


 


Human Metapneumovir PCR    Not detected  (Not Detected)  


 


Influenza A (RT-PCR)    Not detected  (Not Detected)  


 


Influenza B (RT-PCR)    Detected H  (Not Detected)  


 


M. pneumoniae (PCR)    Not detected  (Not Detected)  


 


Parainfluen 1,2,3,4 PCR    Not detected  (Not Detected)  


 


RSV (PCR)    Not detected  (Not Detected)  


 


Entero/Rhino (PCR)    Not detected  (Not Detected)  














  01/07/20 01/07/20 01/07/20 Range/Units





  05:20 08:07 09:09 


 


WBC     (3.98-10.04)  K/mm3


 


RBC     (3.98-5.22)  M/mm3


 


Hgb     (11.2-15.7)  gm/dl


 


Hct     (34.1-44.9)  %


 


MCV     (79.4-94.8)  fl


 


MCH     (25.6-32.2)  pg


 


MCHC     (32.2-35.5)  g/dl


 


RDW Std Deviation     (36.4-46.3)  fL


 


Plt Count     (182-369)  K/mm3


 


MPV     (9.4-12.3)  fl


 


Neut % (Auto)     (34.0-71.1)  %


 


Lymph % (Auto)     (19.3-51.7)  %


 


Mono % (Auto)     (4.7-12.5)  %


 


Eos % (Auto)     (0.7-5.8)  


 


Baso % (Auto)     (0.1-1.2)  %


 


Neut # (Auto)     (1.56-6.13)  K/mm3


 


Lymph # (Auto)     (1.18-3.74)  K/mm3


 


Mono # (Auto)     (0.24-0.36)  K/mm3


 


Eos # (Auto)     (0.04-0.36)  K/mm3


 


Baso # (Auto)     (0.01-0.08)  K/mm3


 


Glucose     ()  mg/dL


 


POC Glucose   240 H  243 H  ()  mg/dL


 


Hemoglobin A1c  13.10 H    (4.50-6.20)  %


 


Phosphorus     (2.6-4.7)  mg/dL


 


Magnesium     (1.8-2.4)  mg/dl


 


Total Bilirubin     (0.2-1.0)  mg/dL


 


Direct Bilirubin     (0.0-0.2)  mg/dl


 


Indirect Bilirubin     


 


AST     (15-37)  U/L


 


ALT     (14-59)  U/L


 


Alkaline Phosphatase     ()  U/L


 


Total Protein     (6.4-8.2)  g/dl


 


Albumin     (3.4-5.0)  g/dl


 


Globulin     gm/dL


 


Albumin/Globulin Ratio     (1-2)  


 


Ur Eosinophil Smear     (Eos Absent)  % EOS


 


Ur Urea Nitrogen Conc     mg/dL


 


Adenovirus (PCR)     (Not Detected)  


 


B. pertussis DNA (PCR)     (Not Detected)  


 


B.parapertussis DNA PCR     (Not Detected)  


 


C. pneumoniae DNA (PCR)     (Not Detected)  


 


Coronavirus (PCR)     (Not Detected)  


 


Human Metapneumovir PCR     (Not Detected)  


 


Influenza A (RT-PCR)     (Not Detected)  


 


Influenza B (RT-PCR)     (Not Detected)  


 


M. pneumoniae (PCR)     (Not Detected)  


 


Parainfluen 1,2,3,4 PCR     (Not Detected)  


 


RSV (PCR)     (Not Detected)  


 


Entero/Rhino (PCR)     (Not Detected)  














  01/07/20 01/07/20 01/08/20 Range/Units





  16:19 21:32 04:30 


 


WBC    5.58  (3.98-10.04)  K/mm3


 


RBC    4.77  (3.98-5.22)  M/mm3


 


Hgb    13.5  (11.2-15.7)  gm/dl


 


Hct    40.6  (34.1-44.9)  %


 


MCV    85.1  (79.4-94.8)  fl


 


MCH    28.3  (25.6-32.2)  pg


 


MCHC    33.3  (32.2-35.5)  g/dl


 


RDW Std Deviation    44.7  (36.4-46.3)  fL


 


Plt Count    129 L  (182-369)  K/mm3


 


MPV    12.4 H  (9.4-12.3)  fl


 


Neut % (Auto)    63.6  (34.0-71.1)  %


 


Lymph % (Auto)    22.2  (19.3-51.7)  %


 


Mono % (Auto)    12.0  (4.7-12.5)  %


 


Eos % (Auto)    2.0  (0.7-5.8)  


 


Baso % (Auto)    0.0 L  (0.1-1.2)  %


 


Neut # (Auto)    3.55  (1.56-6.13)  K/mm3


 


Lymph # (Auto)    1.24  (1.18-3.74)  K/mm3


 


Mono # (Auto)    0.67 H  (0.24-0.36)  K/mm3


 


Eos # (Auto)    0.11  (0.04-0.36)  K/mm3


 


Baso # (Auto)    0.00 L  (0.01-0.08)  K/mm3


 


Glucose   363 H   ()  mg/dL


 


POC Glucose  298 H    ()  mg/dL


 


Hemoglobin A1c     (4.50-6.20)  %


 


Phosphorus     (2.6-4.7)  mg/dL


 


Magnesium     (1.8-2.4)  mg/dl


 


Total Bilirubin     (0.2-1.0)  mg/dL


 


Direct Bilirubin     (0.0-0.2)  mg/dl


 


Indirect Bilirubin     


 


AST     (15-37)  U/L


 


ALT     (14-59)  U/L


 


Alkaline Phosphatase     ()  U/L


 


Total Protein     (6.4-8.2)  g/dl


 


Albumin     (3.4-5.0)  g/dl


 


Globulin     gm/dL


 


Albumin/Globulin Ratio     (1-2)  


 


Ur Eosinophil Smear     (Eos Absent)  % EOS


 


Ur Urea Nitrogen Conc     mg/dL


 


Adenovirus (PCR)     (Not Detected)  


 


B. pertussis DNA (PCR)     (Not Detected)  


 


B.parapertussis DNA PCR     (Not Detected)  


 


C. pneumoniae DNA (PCR)     (Not Detected)  


 


Coronavirus (PCR)     (Not Detected)  


 


Human Metapneumovir PCR     (Not Detected)  


 


Influenza A (RT-PCR)     (Not Detected)  


 


Influenza B (RT-PCR)     (Not Detected)  


 


M. pneumoniae (PCR)     (Not Detected)  


 


Parainfluen 1,2,3,4 PCR     (Not Detected)  


 


RSV (PCR)     (Not Detected)  


 


Entero/Rhino (PCR)     (Not Detected)  














  01/08/20 Range/Units





  04:30 


 


WBC   (3.98-10.04)  K/mm3


 


RBC   (3.98-5.22)  M/mm3


 


Hgb   (11.2-15.7)  gm/dl


 


Hct   (34.1-44.9)  %


 


MCV   (79.4-94.8)  fl


 


MCH   (25.6-32.2)  pg


 


MCHC   (32.2-35.5)  g/dl


 


RDW Std Deviation   (36.4-46.3)  fL


 


Plt Count   (182-369)  K/mm3


 


MPV   (9.4-12.3)  fl


 


Neut % (Auto)   (34.0-71.1)  %


 


Lymph % (Auto)   (19.3-51.7)  %


 


Mono % (Auto)   (4.7-12.5)  %


 


Eos % (Auto)   (0.7-5.8)  


 


Baso % (Auto)   (0.1-1.2)  %


 


Neut # (Auto)   (1.56-6.13)  K/mm3


 


Lymph # (Auto)   (1.18-3.74)  K/mm3


 


Mono # (Auto)   (0.24-0.36)  K/mm3


 


Eos # (Auto)   (0.04-0.36)  K/mm3


 


Baso # (Auto)   (0.01-0.08)  K/mm3


 


Glucose   ()  mg/dL


 


POC Glucose   ()  mg/dL


 


Hemoglobin A1c   (4.50-6.20)  %


 


Phosphorus  2.6  (2.6-4.7)  mg/dL


 


Magnesium  2.2  (1.8-2.4)  mg/dl


 


Total Bilirubin  0.3  (0.2-1.0)  mg/dL


 


Direct Bilirubin  0.20  (0.0-0.2)  mg/dl


 


Indirect Bilirubin  0.10  


 


AST  56 H  (15-37)  U/L


 


ALT  50  (14-59)  U/L


 


Alkaline Phosphatase  211 H  ()  U/L


 


Total Protein  6.0 L  (6.4-8.2)  g/dl


 


Albumin  2.2 L  (3.4-5.0)  g/dl


 


Globulin  3.8  gm/dL


 


Albumin/Globulin Ratio  0.6 L  (1-2)  


 


Ur Eosinophil Smear   (Eos Absent)  % EOS


 


Ur Urea Nitrogen Conc   mg/dL


 


Adenovirus (PCR)   (Not Detected)  


 


B. pertussis DNA (PCR)   (Not Detected)  


 


B.parapertussis DNA PCR   (Not Detected)  


 


C. pneumoniae DNA (PCR)   (Not Detected)  


 


Coronavirus (PCR)   (Not Detected)  


 


Human Metapneumovir PCR   (Not Detected)  


 


Influenza A (RT-PCR)   (Not Detected)  


 


Influenza B (RT-PCR)   (Not Detected)  


 


M. pneumoniae (PCR)   (Not Detected)  


 


Parainfluen 1,2,3,4 PCR   (Not Detected)  


 


RSV (PCR)   (Not Detected)  


 


Entero/Rhino (PCR)   (Not Detected)  











[facility] Results Last 24 Hours: 


 Microbiology











 01/06/20 14:10 Aerobic Blood Culture - Preliminary





 Blood - Venous    NO GROWTH AFTER 1 DAY





 Anaerobic Blood Culture - Final


 


 01/06/20 12:58 Aerobic Blood Culture - Preliminary





 Blood - Venous - Lab Draw    NO GROWTH AFTER 1 DAY





 Anaerobic Blood Culture - Preliminary





    NO GROWTH AFTER 1 DAY











Med Orders - Current: 


 Current Medications





Albuterol/Ipratropium (Duoneb 3.0-0.5 Mg/3 Ml)  3 ml NEB Q4H PRN


   PRN Reason: Shortness Of Breath/wheezing


Alprazolam (Xanax)  0.25 mg PO BID PRN


   PRN Reason: Anxiety


   Last Admin: 01/07/20 21:27 Dose:  0.25 mg


Aripiprazole (Abilify)  40 mg PO DAILY [facility]


Cyclobenzaprine HCl (Flexeril)  10 mg PO TID PRN


   PRN Reason: Pain


   Last Admin: 01/07/20 13:39 Dose:  10 mg


Fluoxetine HCl (Prozac)  80 mg PO DAILY [facility]


Guaifenesin/Phenylephrine HCl (Robitussin Dm)  10 ml PO Q6H PRN


   PRN Reason: Cough


   Last Admin: 01/07/20 00:02 Dose:  10 ml


Heparin Sodium (Porcine) (Heparin Sodium)  5,000 units SUBCUT Q8H [facility]


   Last Admin: 01/08/20 02:22 Dose:  5,000 units


Hydromorphone HCl (Dilaudid)  0.5 mg IVPUSH Q2H PRN


   PRN Reason: Pain (severe 7-10)


Hydroxyzine HCl (Atarax)  25 mg PO TID PRN


   PRN Reason: anxiety


Promethazine HCl 6.25 mg/ (Sodium Chloride)  50.25 mls @ 100 mls/hr IV Q6H PRN


   PRN Reason: Nausea/Vomiting


Ibuprofen (Motrin)  600 mg PO Q6H PRN


   PRN Reason: Pain (moderate 4-6)


Insulin Glargine (Lantus)  25 unit SUBCUT DAILY [facility]


Insulin Human Lispro (Humalog)  0 unit SUBCUT QIDACANDBED [facility]; Protocol


   Last Admin: 01/08/20 06:22 Dose:  6 units


Mometasone Furoate/Formoterol Fumar (Dulera 100-5 Mcg)  2 puff IH BID [facility]


   Last Admin: 01/07/20 20:29 Dose:  2 puff


Nystatin (Nystop)  3 gm TOP TID [facility]


   Last Admin: 01/07/20 21:09 Dose:  1 applic


Ondansetron HCl (Zofran)  4 mg IV Q6H PRN


   PRN Reason: Nausea/Vomiting


Pantoprazole Sodium (Protonix***)  40 mg PO BIDAC [facility]


   Last Admin: 01/08/20 06:24 Dose:  40 mg


Dulaglutide [ Trulicity] Injection 1.5 Mg Pen  0 each SUBCUT TU [facility]


   Last Admin: 01/07/20 21:37 Dose:  Not Given


Pregabalin (Lyrica)  300 mg PO BID [facility]


   Last Admin: 01/07/20 20:55 Dose:  300 mg


Risperidone (Risperidal)  3 mg PO BID [facility]


   Last Admin: 01/07/20 21:04 Dose:  Not Given


Rosuvastatin Calcium (Crestor)  20 mg PO BEDTIME [facility]


   Last Admin: 01/07/20 20:55 Dose:  20 mg


Sodium Phosphate (Neutra-Phos)  250 mg PO BID [facility]


   Stop: 01/08/20 21:01


   Last Admin: 01/07/20 20:55 Dose:  250 mg





Discontinued Medications





Aripiprazole (Abilify)  40 mg PO ONETIME ONE


   Stop: 01/07/20 13:31


   Last Admin: 01/07/20 13:39 Dose:  40 mg


Haloperidol Lactate (Haldol)  5 mg IM ONETIME ONE


   Stop: 01/07/20 13:38


   Last Admin: 01/07/20 14:19 Dose:  Not Given


Hydromorphone HCl (Dilaudid)  0.5 mg IVPUSH ONETIME ONE


   Stop: 01/06/20 14:21


   Last Admin: 01/06/20 14:47 Dose:  0.5 mg


Sodium Chloride (Normal Saline)  1,000 mls @ 999 mls/hr IV ASDIRECTED [facility]


   Last Admin: 01/06/20 13:20 Dose:  999 mls/hr


Insulin Human Regular 100 unit (/ Sodium Chloride)  100 mls @ 10.7 mls/hr IV 

TITRATE [facility]; Protocol


Insulin Human Regular 100 unit (/ Sodium Chloride)  100 mls @ 10 mls/hr IV 

TITRATE [facility]; Protocol


   Last Titration: 01/07/20 09:05 Dose:  0 unit/hr, 0 mls/hr


Sodium Chloride (Normal Saline)  1,000 mls @ 999 mls/hr IV ASDIRECTED [facility]


   Last Admin: 01/06/20 15:30 Dose:  999 mls/hr


Potassium Chloride 10 meq/ (Premix)  100 mls @ 100 mls/hr IV Q1H [facility]


   Stop: 01/06/20 18:14


   Last Admin: 01/06/20 18:29 Dose:  100 mls/hr


Lactated Ringer's (Ringers, Lactated)  1,000 mls @ 125 mls/hr IV ASDIRECTED [facility]


   Last Admin: 01/07/20 02:39 Dose:  125 mls/hr


Sodium Bicarbonate 150 meq/ (Dextrose/Water)  1,150 mls @ 50 mls/hr IV ONETIME 

ONE


   Stop: 01/07/20 17:11


   Last Admin: 01/06/20 18:31 Dose:  50 mls/hr


Dextrose/Water (Dextrose 5% In Water)  1,000 mls @ 150 mls/hr IV ASDIRECTED [facility]


   Last Admin: 01/07/20 03:36 Dose:  150 mls/hr


Potassium Chloride 10 meq/ (Premix)  100 mls @ 100 mls/hr IV Q1H [facility]


   Stop: 01/07/20 02:59


   Last Admin: 01/07/20 02:37 Dose:  100 mls/hr


Magnesium Sulfate 2 gm/ Premix  50 mls @ 25 mls/hr IV ONETIME ONE


   Stop: 01/07/20 09:05


   Last Admin: 01/07/20 08:58 Dose:  25 mls/hr


Lactated Ringer's (Ringers, Lactated)  1,000 mls @ 25 mls/hr IV ASDIRECTED [facility]


   Last Admin: 01/07/20 09:26 Dose:  25 mls/hr


Insulin Glargine (Lantus)  20 unit SUBCUT DAILY [facility]


   Last Admin: 01/07/20 09:20 Dose:  20 units


Non-Formulary Medication (Aripiprazole)  10 mg PO DAILY [facility]


Non-Formulary Medication (Clonidine.)  0.1 mg PO BID [facility]


Olanzapine (Zyprexa)  10 mg IM ONETIME ONE


   Stop: 01/07/20 12:01


   Last Admin: 01/07/20 11:56 Dose:  10 mg


Pantoprazole Sodium (Protonix Iv***)  40 mg IV Q12H [facility]


   Last Admin: 01/07/20 20:29 Dose:  Not Given


Potassium Chloride (Klor-Con M20)  60 meq PO ONETIME ONE


   Stop: 01/06/20 22:58


   Last Admin: 01/06/20 23:10 Dose:  60 meq


Sodium Phosphate (Neutra-Phos)  500 mg PO ONETIME ONE


   Stop: 01/07/20 07:07


   Last Admin: 01/07/20 09:13 Dose:  500 mg











- Exam


General: Alert, Cooperative, No Acute Distress, Other (Morbid)


HEENT: Pupils Equal, Pupils Reactive, EOMI, Mucous Membr. Moist/Pink


Neck: Supple


Lungs: Clear to Auscultation, Normal Respiratory Effort


Cardiovascular: Regular Rate, Regular Rhythm


GI/Abdominal Exam: Normal Bowel Sounds, Soft, Non-Tender, No Organomegaly, No 

Distention, No Abnormal Bruit


 (Female) Exam: Deferred


Back Exam: Normal Inspection, Decreased Range of Motion


Extremities: Normal Inspection, Normal Range of Motion, Non-Tender, No Pedal 

Edema, Normal Capillary Refill


Peripheral Pulses: 2+: Dorsalis Pedis (L), Dorsalis Pedis (R)


Skin: Warm, Dry, Intact


Neurological: No New Focal Deficit, Normal Gait


Psy/Mental Status: Alert, Normal Affect, Anxious.  No: Suicidal Ideation, 

Homicidal Ideation, Hallucinations


Physical Findings Comments:: 


She was hearing and seeing things before





Sepsis Event Note





- Evaluation


Sepsis Screening Result: No Definite Risk





- Focused Exam


Vital Signs: 


 Vital Signs











  Temp Pulse Resp BP Pulse Ox Pulse Ox Pulse Ox


 


 01/08/20 04:02   95  19   98  


 


 01/08/20 04:00  36.9 C   18  93/53 L  98  


 


 01/08/20 03:00   93  19   100  


 


 01/08/20 02:00   93  18   98  


 


 01/08/20 01:00   94  19   98  


 


 01/08/20 00:00  36.6 C  93  19  91/53 L  98  


 


 01/07/20 23:00   95  21 H   100  


 


 01/07/20 22:40       100 


 


 01/07/20 22:35   97  20   77 L  


 


 01/07/20 22:00   97    91 L  


 


 01/07/20 20:35        99


 


 01/07/20 20:30        99


 


 01/07/20 20:00  37.2 C   20  108/75  99  











Date Exam was Performed: 01/08/20


Time Exam was Performed: 18:45





- Problem List Review


Problem List Initiated/Reviewed/Updated: Yes





- My Orders


Last 24 Hours: 


My Active Orders





01/07/20 08:49


Blood Glucose Check, Bedside [RC] QIDACANDBED 





01/07/20 09:30


Mometasone/Formoterol [Dulera 100-5 MCG]   2 puff IH BID 





01/07/20 11:00


Insulin Lispro [HumaLOG]   See Protocol  SUBCUT QIDACANDBED 





01/07/20 12:54


ALPRAZolam [Xanax]   0.25 mg PO BID PRN 


Cyclobenzaprine [Flexeril]   10 mg PO TID PRN 


One To One Therapy [BH] Routine 





01/07/20 14:33


One To One Therapy [BH] Routine 





01/07/20 15:00


hydrOXYzine HCL [Atarax]   25 mg PO TID PRN 





01/07/20 21:00


Phosphorus #1 [Neutra-Phos]   250 mg PO BID 


Pregabalin [Lyrica]   300 mg PO BID 


Rosuvastatin [Crestor]   20 mg PO BEDTIME 


risperiDONE [RisperiDAL]   3 mg PO BID 





01/07/20 22:00


Patient's Own Medication [Ptom]   0 each SUBCUT TU 





01/07/20 Breakfast


ADA Diabetic [American Diabetic Association Diet] [DIET] 





01/08/20 06:00


Pantoprazole [ProTONIX***]   40 mg PO BIDAC 





01/08/20 07:00


Retroperitoneal Comp [US] Routine 





01/08/20 09:00


ARIPiprazole [Abilify]   40 mg PO DAILY 


FLUoxetine [PROzac]   80 mg PO DAILY 


Insulin Glarg,Human.Rec.Analog [LantUS]   25 unit SUBCUT DAILY 





01/09/20 05:11


CBC WITH AUTO DIFF [HEME] AM 


CMP [COMPREHENSIVE METABOLIC PN,CMP] [CHEM] AM 


MAGNESIUM [CHEM] AM 


PHOSPHORUS [CHEM] AM 





01/10/20 05:11


CBC WITH AUTO DIFF [HEME] AM 


CMP [COMPREHENSIVE METABOLIC PN,CMP] [CHEM] AM 


MAGNESIUM [CHEM] AM 





01/11/20 05:11


CBC WITH AUTO DIFF [HEME] AM 


CMP [COMPREHENSIVE METABOLIC PN,CMP] [CHEM] AM 


MAGNESIUM [CHEM] AM 





01/12/20 05:11


CBC WITH AUTO DIFF [HEME] AM 





01/13/20 05:11


CBC WITH AUTO DIFF [HEME] AM 














- Plan


Plan:: 





Acute:


Psychosis 2/2 underlying Schizophrenia/Schizoaffective Disorder on high dose 

psych regimen. She ripped her IV & Telemetry out and threatened to leave AMA. 

We had to give Zyprexa but she did not finally calm down until she received all 

her psych medications: Xanax 0.25 mg po BID PRN, Abilify 40 mg po daily, 

Flexeril 10 mg po TID PRN, Prozac 80 mg po daily, Risperidone 3 mg po TID and 

Paliperidone 300 mg po BID. I am uncomfortable sending her back home with her 

psych regimen. Tele-psych consult with Dr. Painting.





DM2 with A1C of 13.10, Improved Glucose levels. She takes Metformin and 

Trulicity for maintenance medications. BS in the 200 on average. Will change to 

Lantus 30 units BID and switch to High Intensity ISS. 





ZINA 2/2 Intravascular Volume Depletion from GI/ loss, Continue to Improve. 

Bun of 28 with Cr of 1.5. No baseline for comparison. Continue to avoid 

nephrotoxic agents. 





Hypoalbuminemia. Albumin of 3.1-->2.0-->2.2, Slowly Improving.Dietary consult 

for weight management.





Morbid Obesity. BMI of 41.4. Dietary consult for weight management.





Proteinuria +1 Protein, Glucosuria 2+ Glucose, Ketonuria 2+ Ketones, 

Bilirubinuria, Bacteriuria, and Hematuria 2+ Occult Blood-abnormal finding on 

UA.





Cutaneous Candidiasis in inguinal folds, Inner thighs, Gluteal and Blanca-anal 

and Vaginal area, Continue to Improve. Nystatin powder as indicated and proper 

hygiene. She may need help after discharge due to her body habitus.





Resolved:


S/p AMS 2/2 Metabolic Encephalopathy from DKA. Head CT scan shows no acute 

abnormality. Plan: Supportive care and Aspiration precaution.





S/p DKA. Presented lethargic with BS of 677, LA os 2.7 and Ketones of 14.52. 

Received fluids for initial volume resuscitation and insulin drip in ED prior 

to coming to the unit. Plan: Dr. Larose's DKA protocol.





S/p Lactic Acidosis. LA of 2.7 likely from Metabolic Acidosis due to DKA. Had 

bicarb gtt overnight.





S/p Hyperphosphatemia. PO4 of 4.8-->1.6. 2/2 ZINA. Will monitor.


  


S/p Transaminitis. AST of 60 and ALT of 70. Likely from severe dehydration. 

Will hold statin and acetaminophen for now.





S/p Leukocytosis with WBC of 14.86-->11.12 and 15.38 with 0 Band, Improving. 

Suspected secondary to hemo-concentration.





S/p Hypophosphatemia. PO4 of 4.8-->1.6-->2.6. 2/2 ZINA. Will monitor.





Chronic: Impaired Vision, HLD, COPD, Hypothyroidism, Urinary Incontinence, 

Recurrent UTI, Hx/o Pyelonephritis, Fibromyalgia, Seizure, Schizophrenia/

Schizoaffective Disorder, Muscle Spasm, Anxiety, Depression, and Obesity





Plan: Continue current treatment. Resume home medications. ADA diet. Fall 

Precautions. Code status is full. CM/SW for d/c planning. Dr. Painting consult 

with possible transfer to inpatient psych.

## 2020-01-08 NOTE — PCM.SN
- Free Text/Narrative


Note: 


Called [facility] for placement and refused admission. Called [facility] and Spoke to Dr. Shah. After we went over her case, he recommended to 

stop all her maintenance medications and start Haldol 5 mg po q6H. Once stable, 

he said to send her back home. Will consult Dr. Painting for further input. 

Patient likely need to go to [facility] for further management.

## 2020-01-08 NOTE — CONS
CONSULTING PHYSICIAN:  Yasmani Painting MD

DATE OF CONSULTATION:  2020

 

Site where the services are provided is [facility] in [place], [place].  Site where the services are provided is from our offices in

[facility].  Length of service for this 60-minute inpatient telemedicine

event is 60 minutes.

 

IDENTIFICATION:

The patient is a 64-year-old female, who is admitted to the inpatient MICU at

[facility] in [place], [place].  She is seen for

psychiatric evaluation per the request of staff attending, Dr. aLrose, and his

treatment team.

 

CHIEF COMPLAINT:

"Poverty.  My roommate is a little careless with money."

 

HISTORY OF PRESENT ILLNESS:

The patient is a 64-year-old female who is admitted for complications of DKA on

2020 to [facility] in [place], [place].  The

patient apparently has a history of schizophrenia, and she is admitted with a

number of psychiatric medications and the treatment team is requesting that this

patient's medications be reviewed and adjusted if need be because the patient

has been displaying paranoia on the MICU and even trying to leave the hospital.

The patient is alert and oriented x3 on interview.  She states that "I got a lot

of nerves and anxiety" and states that she has been very tired lately, but

denies any depression and feels that her depression medications are working for

her.  The patient expresses concerns that "I am taking too many medications."

She denies that she is suicidal or homicidal.  She denies any acute psychotic

symptoms, but does endorse paranoia in the form of anxiety and nerves.  She is a

little bit vague on the recent circumstances surrounding her admission, but does

state that she is on disability "for my fibromyalgia and other things."  She

does intimate that she does not feel that she has mental health issues, stating

"they give me medication, psychiatric meds, but I do not think I need them."

She states she has been taking all of the psychiatric medications that she has

been prescribed lately to see if they would work, and she states that she sees a

doctor out [facility] who might be connected to [facility], although

the patient is not really sure of the doctor's name.  It appears that she sees a

doctor out of [facility] for her medical issues.

 

PSYCHIATRIC MEDICATIONS AT TIME OF PRESENTATION:

1. Invega 6 mg daily, but the patient has been getting Risperdal 6 mg b.i.d.

    on the unit.

2. Abilify 10 mg q.a.m. and 40 mg every evening.

3. Xanax 0.25 mg b.i.d.

4. Hydroxyzine 25 mg t.i.d.

5. Prozac 80 mg daily.

6. Insulin.

7. Rifampin.

 

ALLERGIES:

1. Tylenol.

2. Codeine.

3. Morphine.

 

PAST MEDICAL HISTORY:

1. Significant for DKA.

2. History of diabetes.

 

REVIEW OF SYSTEMS:

Aside from endocrine, all other major organ systems appear negative at this

point in time for acute difficulties or complications.

 

FAMILY PSYCHIATRIC AND CD HISTORY:

None reported.

 

PAST PSYCHIATRIC AND CD HISTORY:

The patient does report history of psychiatric hospitalizations, but she is not

certain when they were.  Denies any chemical dependency treatments, not really

able to elaborate on her psychiatric history when asked.

 

SOCIAL HISTORY:

The patient was born and raised in [place], [place].  She has been living in

[place] for the past 15 to 20 years.  She was  x1 for about 15

years, but she states that her  .  She does not have any children.

She lives by herself in [place], but more recently had been staying down in

[place].

 

MENTAL STATUS EXAMINATION:

The patient is a 64-year-old white female, who is in no apparent distress.

Speech is of slow rate and rhythm.  The patient is cognitively oriented x3.

There is no abnormal motor movements or tics observed.  Gait and station are not

observed as the patient is lying in bed during the course of the inpatient

consult.  Mood is significant for anxiety.  Affect is tired appearing, but

cooperative overall for the purposes of the inpatient consult.  There is no

behavioral or stated evidence of acute suicidal or homicidal ideation or acute

psychotic or delusional symptoms.  Paranoid themes are reported by the patient.

Thought processes are significant for some thought blocking and disorganization.

There are no manic symptoms or loose associations evident.  Judgment and insight

do appear impaired secondary to the patient's medical and psychiatric condition.

Motivation for help appears good.

 

VITAL SIGNS:

106/91, 96, 15, and 98.3 degrees.

 

IMPRESSION:

Axis I:

1. Schizophrenia, paranoid versus disorganized type, F20.

2. Major depressive disorder, F32.

3. Anxiety disorder, not otherwise specified, F41.9.

4. History of psychosis, not otherwise specified, F29.

 

Axis II:  None.

Axis III:

1. History of diabetes.

2. History of diabetic ketoacidosis.

 

Axis IV:  Severe.

Axis V:  50-55.

 

PLAN:

1. Discontinue Invega/Risperdal while the patient is on inpatient unit.

2. Discontinue Abilify while the patient is on inpatient unit.

3. Discontinue hydroxyzine while the patient is on inpatient unit and this is

    to see if discontinuing these medications will help the patient clear not

    only medically but also psychiatrically in terms of having the blocked

    thought processes.

4. If the patient does start to exhibit increased paranoia or psychosis, we

    will anticipate starting an antipsychotic medication in that event, but we

    will cross that bridge if need be when those symptoms occur, if they even

    do while the patient is on the inpatient MICU.

5. Continue Xanax 0.25 mg b.i.d. for anxiety reduction.

6. Continue Prozac 80 mg daily.

7. Social Work to obtain collateral information regarding circumstances of the

    patient's admission as well as her past psychiatric and medical care.

8. Recommend that when the patient is medically stabilized that she be

    transferred to inpatient psychiatry for further psychiatric stabilization

    and medication review.

9. Other medications as dosed and prescribed by the patient's primary

    inpatient medical treatment team.

10.We will continue to follow up with the patient on an as-needed basis while

    she remains on the inpatient MICU.

11.We will follow up with the patient sooner if there are any complications in

    the

    interim.

12.Crisis plan is in place.

 

DD:  2020 15:06:56

DT:  2020 18:16:33  [email or web address]

Job #:  [id number]

## 2020-01-09 VITALS — DIASTOLIC BLOOD PRESSURE: 63 MMHG | SYSTOLIC BLOOD PRESSURE: 93 MMHG

## 2020-01-09 VITALS — HEART RATE: 94 BPM

## 2020-01-09 RX ADMIN — HEPARIN SODIUM SCH: 5000 INJECTION, SOLUTION INTRAVENOUS; SUBCUTANEOUS at 10:06

## 2020-01-09 RX ADMIN — POTASSIUM CHLORIDE SCH MEQ: 1500 TABLET, EXTENDED RELEASE ORAL at 08:04

## 2020-01-09 RX ADMIN — HEPARIN SODIUM SCH UNITS: 5000 INJECTION, SOLUTION INTRAVENOUS; SUBCUTANEOUS at 02:05

## 2020-01-09 RX ADMIN — NYSTATIN SCH APPLIC: 100000 POWDER TOPICAL at 08:05

## 2020-01-09 RX ADMIN — MOMETASONE FUROATE AND FORMOTEROL FUMARATE DIHYDRATE SCH PUFF: 100; 5 AEROSOL RESPIRATORY (INHALATION) at 08:10

## 2020-01-09 NOTE — [id number]
<Sarthak Chi - Last Filed: 01/09/20 15:26>





**Discharge Summary





- Discharge Data


Discharge Disposition: DC/[facility]fer to Acute Hospital [facility]


Condition: Good





- Referral to Home Health


Primary Care Physician: 


Owen Warner MD








- Patient Summary/Data


Consults: 


 Consultations





01/06/20 17:13


Consult to Case Management/ [CONS] Routine 


Consult to Diabetic Nurse Specialist [CONS] Routine 


Consult to Dietician [CONS] Routine 


Consult to Spiritual Care [CONS] Routine 


OT Evaluation and Treatment [CONS] Routine 


PT Evaluation and Treatment [CONS] Routine 





01/08/20 11:52


Consult to Physician [CONS] Stat 














- Discharge Plan


Home Medications: 


 Home Meds





Cyclobenzaprine [Flexeril] 10 mg PO TID PRN 12/28/16 [History]


Pregabalin [Lyrica] 300 mg PO BID 12/28/16 [History]


Dulaglutide [Trulicity] 1.5 mg SQ TU 10/24/19 [History]


Fluticasone/Vilanterol [Breo Ellipta 100-25 MCG Inhalation Kit] 1 each IH 

ASDIRECTED 01/06/20 [History]


Rosuvastatin Calcium 20 mg PO BEDTIME 01/06/20 [History]


ALPRAZolam [Alprazolam] 0.25 mg PO BID PRN 01/07/20 [History]


FLUoxetine HCl [Fluoxetine HCl] 80 mg PO DAILY 01/07/20 [History]


hydrOXYzine HCL [Hydroxyzine HCl] 25 mg PO TID PRN 01/07/20 [History]


Insulin Glarg,Human.Rec.Analog [Lantus] 30 unit SUBCUT DAILY  ml 01/09/20 [Rx]


Insulin Lispro [HumaLOG] See Protocol SUBCUT QIDACANDBED #1 vial 01/09/20 [Rx]


Nystatin [Nystop] 3 gm TOP TID  bottle 01/09/20 [Rx]


Oseltamivir [Tamiflu] 75 mg PO BID  cap 01/09/20 [Rx]








Patient Handouts:  Acute Kidney Injury, Adult, Influenza, Adult, Easy-to-Read, 

Skin Yeast Infection, Delirium, Preventing Diabetic Ketoacidosis, Obesity, Adult

, Easy-to-Read


Referrals: 


Owen Warner MD [Primary Care Provider] - 





- Patient Data


Vitals - Most Recent: 


 Last Vital Signs











Temp  98.1 F   01/09/20 08:19


 


Pulse  94   01/09/20 07:00


 


Resp  18   01/09/20 08:19


 


BP  93/63   01/09/20 08:19


 


Pulse Ox  92 L  01/09/20 09:03











I&O - Last 24 hours: 


 Intake & Output











 01/09/20 01/09/20 01/09/20





 06:59 14:59 22:59


 


Intake Total 400  


 


Balance 400  











Lab Results - Last 24 hrs: 


 Laboratory Results - last 24 hr











  01/08/20 01/08/20 01/08/20 Range/Units





  15:59 17:04 20:23 


 


WBC     (3.98-10.04)  K/mm3


 


RBC     (3.98-5.22)  M/mm3


 


Hgb     (11.2-15.7)  gm/dl


 


Hct     (34.1-44.9)  %


 


MCV     (79.4-94.8)  fl


 


MCH     (25.6-32.2)  pg


 


MCHC     (32.2-35.5)  g/dl


 


RDW Std Deviation     (36.4-46.3)  fL


 


Plt Count     (182-369)  K/mm3


 


MPV     (9.4-12.3)  fl


 


Neut % (Auto)     (34.0-71.1)  %


 


Lymph % (Auto)     (19.3-51.7)  %


 


Mono % (Auto)     (4.7-12.5)  %


 


Eos % (Auto)     (0.7-5.8)  


 


Baso % (Auto)     (0.1-1.2)  %


 


Neut # (Auto)     (1.56-6.13)  K/mm3


 


Lymph # (Auto)     (1.18-3.74)  K/mm3


 


Mono # (Auto)     (0.24-0.36)  K/mm3


 


Eos # (Auto)     (0.04-0.36)  K/mm3


 


Baso # (Auto)     (0.01-0.08)  K/mm3


 


Manual Slide Review     


 


Puncture Site     


 


ABG pH     (7.35-7.45)  


 


ABG pCO2     (35.0-45.0)  mmHg


 


ABG pO2     (80.0-100.0)  mmHg


 


ABG HCO3     (22.0-26.0)  meq/L


 


ABG O2 Saturation     (96.0-97.0)  %


 


ABG Base Excess     (-2-2.0)  


 


Yuri Test     


 


A-a Gradient     mmHg


 


O2 Delivery Device     


 


Oxygen Flow Rate     


 


FiO2     (21..00)  %


 


Sodium   142   (136-145)  mEq/L


 


Potassium   2.8 L   (3.5-5.1)  mEq/L


 


Chloride   104   ()  mEq/L


 


Carbon Dioxide   31   (21-32)  mEq/L


 


Anion Gap   9.8   (5-15)  


 


BUN   18   (7-18)  mg/dL


 


Creatinine   0.9   (0.55-1.02)  mg/dL


 


Est Cr Clr Drug Dosing   58.32   mL/min


 


Estimated GFR (MDRD)   > 60   (>60)  mL/min


 


BUN/Creatinine Ratio   20.0 H   (14-18)  


 


Glucose   164 H   ()  mg/dL


 


POC Glucose  192 H   233 H  ()  mg/dL


 


Calcium   8.1 L   (8.5-10.1)  mg/dL


 


Phosphorus     (2.6-4.7)  mg/dL


 


Magnesium     (1.8-2.4)  mg/dl


 


Total Bilirubin     (0.2-1.0)  mg/dL


 


AST     (15-37)  U/L


 


ALT     (14-59)  U/L


 


Alkaline Phosphatase     ()  U/L


 


Total Protein     (6.4-8.2)  g/dl


 


Albumin     (3.4-5.0)  g/dl


 


Globulin     gm/dL


 


Albumin/Globulin Ratio     (1-2)  














  01/09/20 01/09/20 01/09/20 Range/Units





  04:58 04:58 06:26 


 


WBC  3.29 L    (3.98-10.04)  K/mm3


 


RBC  4.21    (3.98-5.22)  M/mm3


 


Hgb  11.6  D    (11.2-15.7)  gm/dl


 


Hct  37.0    (34.1-44.9)  %


 


MCV  87.9    (79.4-94.8)  fl


 


MCH  27.6    (25.6-32.2)  pg


 


MCHC  31.4 L    (32.2-35.5)  g/dl


 


RDW Std Deviation  47.3 H    (36.4-46.3)  fL


 


Plt Count  77 L    (182-369)  K/mm3


 


MPV  12.0    (9.4-12.3)  fl


 


Neut % (Auto)  65.4    (34.0-71.1)  %


 


Lymph % (Auto)  24.3    (19.3-51.7)  %


 


Mono % (Auto)  8.5    (4.7-12.5)  %


 


Eos % (Auto)  1.8    (0.7-5.8)  


 


Baso % (Auto)  0.0 L    (0.1-1.2)  %


 


Neut # (Auto)  2.15    (1.56-6.13)  K/mm3


 


Lymph # (Auto)  0.80 L    (1.18-3.74)  K/mm3


 


Mono # (Auto)  0.28    (0.24-0.36)  K/mm3


 


Eos # (Auto)  0.06    (0.04-0.36)  K/mm3


 


Baso # (Auto)  0.00 L    (0.01-0.08)  K/mm3


 


Manual Slide Review  Abnormal smear    


 


Puncture Site     


 


ABG pH     (7.35-7.45)  


 


ABG pCO2     (35.0-45.0)  mmHg


 


ABG pO2     (80.0-100.0)  mmHg


 


ABG HCO3     (22.0-26.0)  meq/L


 


ABG O2 Saturation     (96.0-97.0)  %


 


ABG Base Excess     (-2-2.0)  


 


Yuri Test     


 


A-a Gradient     mmHg


 


O2 Delivery Device     


 


Oxygen Flow Rate     


 


FiO2     (21..00)  %


 


Sodium   142   (136-145)  mEq/L


 


Potassium   3.7   (3.5-5.1)  mEq/L


 


Chloride   106   ()  mEq/L


 


Carbon Dioxide   30   (21-32)  mEq/L


 


Anion Gap   9.7   (5-15)  


 


BUN   18   (7-18)  mg/dL


 


Creatinine   0.9   (0.55-1.02)  mg/dL


 


Est Cr Clr Drug Dosing   58.32   mL/min


 


Estimated GFR (MDRD)   > 60   (>60)  mL/min


 


BUN/Creatinine Ratio   20.0 H   (14-18)  


 


Glucose   184 H   ()  mg/dL


 


POC Glucose    184 H  ()  mg/dL


 


Calcium   8.0 L   (8.5-10.1)  mg/dL


 


Phosphorus   2.8   (2.6-4.7)  mg/dL


 


Magnesium   2.0   (1.8-2.4)  mg/dl


 


Total Bilirubin   0.3   (0.2-1.0)  mg/dL


 


AST   55 H   (15-37)  U/L


 


ALT   45   (14-59)  U/L


 


Alkaline Phosphatase   193 H   ()  U/L


 


Total Protein   5.4 L   (6.4-8.2)  g/dl


 


Albumin   1.8 L   (3.4-5.0)  g/dl


 


Globulin   3.6   gm/dL


 


Albumin/Globulin Ratio   0.5 L   (1-2)  














  01/09/20 01/09/20 Range/Units





  08:54 08:57 


 


WBC    (3.98-10.04)  K/mm3


 


RBC    (3.98-5.22)  M/mm3


 


Hgb    (11.2-15.7)  gm/dl


 


Hct    (34.1-44.9)  %


 


MCV    (79.4-94.8)  fl


 


MCH    (25.6-32.2)  pg


 


MCHC    (32.2-35.5)  g/dl


 


RDW Std Deviation    (36.4-46.3)  fL


 


Plt Count    (182-369)  K/mm3


 


MPV    (9.4-12.3)  fl


 


Neut % (Auto)    (34.0-71.1)  %


 


Lymph % (Auto)    (19.3-51.7)  %


 


Mono % (Auto)    (4.7-12.5)  %


 


Eos % (Auto)    (0.7-5.8)  


 


Baso % (Auto)    (0.1-1.2)  %


 


Neut # (Auto)    (1.56-6.13)  K/mm3


 


Lymph # (Auto)    (1.18-3.74)  K/mm3


 


Mono # (Auto)    (0.24-0.36)  K/mm3


 


Eos # (Auto)    (0.04-0.36)  K/mm3


 


Baso # (Auto)    (0.01-0.08)  K/mm3


 


Manual Slide Review    


 


Puncture Site  Rt radial   


 


ABG pH  7.42   (7.35-7.45)  


 


ABG pCO2  46.8 H   (35.0-45.0)  mmHg


 


ABG pO2  52.0 L   (80.0-100.0)  mmHg


 


ABG HCO3  29.8 H   (22.0-26.0)  meq/L


 


ABG O2 Saturation  88.0 L   (96.0-97.0)  %


 


ABG Base Excess  5.0 H   (-2-2.0)  


 


Yuri Test  Positive   


 


A-a Gradient  39   mmHg


 


O2 Delivery Device  Room air   


 


Oxygen Flow Rate  0.0   


 


FiO2  21.00   (21..00)  %


 


Sodium    (136-145)  mEq/L


 


Potassium    (3.5-5.1)  mEq/L


 


Chloride    ()  mEq/L


 


Carbon Dioxide    (21-32)  mEq/L


 


Anion Gap    (5-15)  


 


BUN    (7-18)  mg/dL


 


Creatinine    (0.55-1.02)  mg/dL


 


Est Cr Clr Drug Dosing    mL/min


 


Estimated GFR (MDRD)    (>60)  mL/min


 


BUN/Creatinine Ratio    (14-18)  


 


Glucose    ()  mg/dL


 


POC Glucose   201 H  ()  mg/dL


 


Calcium    (8.5-10.1)  mg/dL


 


Phosphorus    (2.6-4.7)  mg/dL


 


Magnesium    (1.8-2.4)  mg/dl


 


Total Bilirubin    (0.2-1.0)  mg/dL


 


AST    (15-37)  U/L


 


ALT    (14-59)  U/L


 


Alkaline Phosphatase    ()  U/L


 


Total Protein    (6.4-8.2)  g/dl


 


Albumin    (3.4-5.0)  g/dl


 


Globulin    gm/dL


 


Albumin/Globulin Ratio    (1-2)  











LOYDA Results - Last 24 hrs: 


 Microbiology











 01/06/20 14:10 Aerobic Blood Culture - Preliminary





 Blood - Venous    NO GROWTH AFTER 3 DAYS





 Anaerobic Blood Culture - Final


 


 01/06/20 12:58 Aerobic Blood Culture - Preliminary





 Blood - Venous - Lab Draw    NO GROWTH AFTER 3 DAYS





 Anaerobic Blood Culture - Preliminary





    NO GROWTH AFTER 3 DAYS











Med Orders - Current: 


 Current Medications








Discontinued Medications





Albuterol/Ipratropium (Duoneb 3.0-0.5 Mg/3 Ml)  3 ml NEB Q4H PRN


   PRN Reason: Shortness Of Breath/wheezing


Alprazolam (Xanax)  0.25 mg PO BID PRN


   PRN Reason: Anxiety


   Last Admin: 01/09/20 08:05 Dose:  0.25 mg


Aripiprazole (Abilify)  40 mg PO ONETIME ONE


   Stop: 01/07/20 13:31


   Last Admin: 01/07/20 13:39 Dose:  40 mg


Aripiprazole (Abilify)  40 mg PO DAILY [facility]


   Last Admin: 01/08/20 08:05 Dose:  40 mg


Cyclobenzaprine HCl (Flexeril)  10 mg PO TID PRN


   PRN Reason: Pain


   Last Admin: 01/07/20 13:39 Dose:  10 mg


Fluoxetine HCl (Prozac)  80 mg PO DAILY [facility]


   Last Admin: 01/09/20 08:05 Dose:  80 mg


Guaifenesin/Phenylephrine HCl (Robitussin Dm)  10 ml PO Q6H PRN


   PRN Reason: Cough


   Last Admin: 01/08/20 20:18 Dose:  10 ml


Haloperidol Lactate (Haldol)  5 mg IM ONETIME ONE


   Stop: 01/07/20 13:38


   Last Admin: 01/07/20 14:19 Dose:  Not Given


Heparin Sodium (Porcine) (Heparin Sodium)  5,000 units SUBCUT Q8H [facility]


   Last Admin: 01/09/20 10:06 Dose:  Not Given


Hydromorphone HCl (Dilaudid)  0.5 mg IVPUSH ONETIME ONE


   Stop: 01/06/20 14:21


   Last Admin: 01/06/20 14:47 Dose:  0.5 mg


Hydromorphone HCl (Dilaudid)  0.5 mg IVPUSH Q2H PRN


   PRN Reason: Pain (severe 7-10)


Hydroxyzine HCl (Atarax)  25 mg PO TID PRN


   PRN Reason: anxiety


Sodium Chloride (Normal Saline)  1,000 mls @ 999 mls/hr IV ASDIRECTED [facility]


   Last Admin: 01/06/20 13:20 Dose:  999 mls/hr


Insulin Human Regular 100 unit (/ Sodium Chloride)  100 mls @ 10.7 mls/hr IV 

TITRATE [facility]; Protocol


Insulin Human Regular 100 unit (/ Sodium Chloride)  100 mls @ 10 mls/hr IV 

TITRATE [facility]; Protocol


   Last Titration: 01/07/20 09:05 Dose:  0 unit/hr, 0 mls/hr


Sodium Chloride (Normal Saline)  1,000 mls @ 999 mls/hr IV ASDIRECTED [facility]


   Last Admin: 01/06/20 15:30 Dose:  999 mls/hr


Potassium Chloride 10 meq/ (Premix)  100 mls @ 100 mls/hr IV Q1H [facility]


   Stop: 01/06/20 18:14


   Last Admin: 01/06/20 18:29 Dose:  100 mls/hr


Lactated Ringer's (Ringers, Lactated)  1,000 mls @ 125 mls/hr IV ASDIRECTED [facility]


   Last Admin: 01/07/20 02:39 Dose:  125 mls/hr


Promethazine HCl 6.25 mg/ (Sodium Chloride)  50.25 mls @ 100 mls/hr IV Q6H PRN


   PRN Reason: Nausea/Vomiting


Sodium Bicarbonate 150 meq/ (Dextrose/Water)  1,150 mls @ 50 mls/hr IV ONETIME 

ONE


   Stop: 01/07/20 17:11


   Last Admin: 01/06/20 18:31 Dose:  50 mls/hr


Dextrose/Water (Dextrose 5% In Water)  1,000 mls @ 150 mls/hr IV ASDIRECTED [facility]


   Last Admin: 01/07/20 03:36 Dose:  150 mls/hr


Potassium Chloride 10 meq/ (Premix)  100 mls @ 100 mls/hr IV Q1H [facility]


   Stop: 01/07/20 02:59


   Last Admin: 01/07/20 02:37 Dose:  100 mls/hr


Magnesium Sulfate 2 gm/ Premix  50 mls @ 25 mls/hr IV ONETIME ONE


   Stop: 01/07/20 09:05


   Last Admin: 01/07/20 08:58 Dose:  25 mls/hr


Lactated Ringer's (Ringers, Lactated)  1,000 mls @ 25 mls/hr IV ASDIRECTED [facility]


   Last Admin: 01/07/20 09:26 Dose:  25 mls/hr


Ibuprofen (Motrin)  600 mg PO Q6H PRN


   PRN Reason: Pain (moderate 4-6)


Insulin Glargine (Lantus)  20 unit SUBCUT DAILY [facility]


   Last Admin: 01/07/20 09:20 Dose:  20 units


Insulin Glargine (Lantus)  25 unit SUBCUT DAILY [facility]


   Last Admin: 01/08/20 08:04 Dose:  25 units


Insulin Glargine (Lantus)  30 unit SUBCUT DAILY [facility]


   Last Admin: 01/09/20 08:05 Dose:  30 units


Insulin Human Lispro (Humalog)  0 unit SUBCUT QIDACANDBED [facility]; Protocol


   Last Admin: 01/09/20 06:27 Dose:  3 units


Mometasone Furoate/Formoterol Fumar (Dulera 100-5 Mcg)  2 puff IH BID [facility]


   Last Admin: 01/09/20 08:10 Dose:  2 puff


Non-Formulary Medication (Aripiprazole)  10 mg PO DAILY [facility]


Non-Formulary Medication (Clonidine.)  0.1 mg PO BID [facility]


Nystatin (Nystop)  3 gm TOP TID [facility]


   Last Admin: 01/09/20 08:05 Dose:  1 applic


Olanzapine (Zyprexa)  10 mg IM ONETIME ONE


   Stop: 01/07/20 12:01


   Last Admin: 01/07/20 11:56 Dose:  10 mg


Ondansetron HCl (Zofran)  4 mg IV Q6H PRN


   PRN Reason: Nausea/Vomiting


Oseltamivir Phosphate (Tamiflu)  75 mg PO BID [facility]


   Stop: 01/13/20 09:01


   Last Admin: 01/09/20 08:05 Dose:  75 mg


Pantoprazole Sodium (Protonix Iv***)  40 mg IV Q12H [facility]


   Last Admin: 01/07/20 20:29 Dose:  Not Given


Pantoprazole Sodium (Protonix***)  40 mg PO BIDAC [facility]


   Last Admin: 01/09/20 06:28 Dose:  40 mg


Dulaglutide [ Trulicity] Injection 1.5 Mg Pen  0 each SUBCUT TU [facility]


   Last Admin: 01/07/20 21:37 Dose:  Not Given


Potassium Chloride (Klor-Con M20)  60 meq PO ONETIME ONE


   Stop: 01/06/20 22:58


   Last Admin: 01/06/20 23:10 Dose:  60 meq


Potassium Chloride (Klor-Con M20)  80 meq PO BID [facility]


   Stop: 01/09/20 09:01


   Last Admin: 01/09/20 08:04 Dose:  80 meq


Pregabalin (Lyrica)  300 mg PO BID [facility]


   Last Admin: 01/09/20 08:05 Dose:  300 mg


Risperidone (Risperidal)  3 mg PO BID [facility]


   Last Admin: 01/08/20 08:06 Dose:  3 mg


Rosuvastatin Calcium (Crestor)  20 mg PO BEDTIME [facility]


   Last Admin: 01/08/20 20:18 Dose:  20 mg


Sodium Phosphate (Neutra-Phos)  500 mg PO ONETIME ONE


   Stop: 01/07/20 07:07


   Last Admin: 01/07/20 09:13 Dose:  500 mg


Sodium Phosphate (Neutra-Phos)  250 mg PO BID [facility]


   Stop: 01/08/20 21:01


   Last Admin: 01/08/20 20:17 Dose:  250 mg











<Marium Larose T - Last Filed: 01/10/20 11:33>





**Discharge Summary





- Hospital Course


Brief History: This is a 63 yo white female with past medical hx/o Impaired 

Vision, HLD, COPD, Hypothyroidism, Urinary Incontinence, Recurrent UTI, Hx/o 

Pyelonephritis, Fibromyalgia, Seizure, Schizophrenia/Schizoaffective Disorder, 

Muscle Spasm, Anxiety, Depression, and Obesity who was brought in by a 

concerned neighbor due to feeling ill for the past 2 weeks. Her symptoms 

gradually worsened in the past couple of days. States she has been having 

abdominal pain associated with nausea, vomiting, and diarrhea. She also has 

been extremely thirsty and report increased fluid intake. Her appetite was poor 

due to GI symptoms. Additionally, she had tremors on her right arm when her 

neighbors checked on her today. HPI is incomplete with patient being sedated/

lethargic.  Her initial work up in ED shows a CBC remarkable for WBC of 14.86, 

RBC of 5.92, Hgb of 16.4, Hct of 49.6, MPV of 13.2, Neutrophils of 84.6%, 

Lymphocytes of 7.9%, Neutrophil # of 12.57, Lymphocyte # of 1.17, and Monocyte 

# of 1.07. Her coagulation studies show PT of 13.9 and INR of 1.29. Her ABG 

shows pH of 7.4, pCO2 of 38.5, HCO3 of 17.3, and O2 Sat of 94.4% on RA. Her 

Chemistry is significant for Cl of 95, AG of 31.8, BUN of 28, Cr of 1.5, BS of 

677, Serum Osm of 351, LA of 2.7, Phos of 4.8, AST of 60, ALT of 70, Alk Phos 

of 257, ProBNP of 170, Total Protein of 8.5, and Albumin of 3.1. Her A1C is 

10.90 with Ketones of 14.52. Her UA is not suggestive of UTI. Her chest x-ray 

and head CT scan reports read as nothing acute is appreciated. Patient received 

initial treatment in ED before coming to the unit for further management of DKA.


Diagnosis: Stroke: No


Modified Porter Scale: No Symptoms at All


Modified Porter Scale Score: 0





- Discharge Data


Discharge Date: 01/09/20





- Referral to Home Health


Primary Care Physician: 


Owen Warner MD








- Patient Summary/Data


Operative Procedure(s) Performed: None


Complications: None


Consults: 


 Consultations





01/06/20 17:13


Consult to Case Management/ [CONS] Routine 


Consult to Diabetic Nurse Specialist [CONS] Routine 


Consult to Dietician [CONS] Routine 


Consult to Spiritual Care [CONS] Routine 


OT Evaluation and Treatment [CONS] Routine 


PT Evaluation and Treatment [CONS] Routine 





01/08/20 11:52


Consult to Physician [CONS] Stat 











Labs Pending at D/C: 


None





Hospital Course: 


Patient was primarily managed with DKA Protocol to resolve her ketoacidosis as 

well as acute kidney injury from volume depletion and right away she improved 

on this regimen. She also received nystatin powder for cutaneous candidiasis 

and Tamiflu for influenza B infection. Her hospital course was fairly 

uncomplicated but she developed acute psychosis due to underlying 

schizophrenia. However we were able to stabilize her with a one time dose of 10 

mg Zyprexa along with her other psychiatric medications. During this 

hospitalization we were concerned about her mental stability and therefore we 

consulted Tele-psych. Dr. Painting trimmed her psychiatric regimen and recommended 

inpatient psych treatment.





Once medically stable, we tried to ship her to [place] but without any 

success. We then tried the [facility] and Dr. Stanton, attending 

psychiatrist, agreed to accept her during the doc-to-doc transfer 

communication. 





Patient was released with a LA and SA insulin regimen to continue with diabetes 

management and Tamiflu for influenza infection. She was transported via 

ambulance equipped with appropriate medical personnel when she left our 

facility.





- Patient Instructions


Diet: Heart Healthy Diet, Diabetic Diet, Weight Loss Diet


Activity: As Tolerated


Driving: Do Not Drive


Showering/Bathing: May Shower


Notify Provider of: Fever, Increased Pain, Swelling and Redness, Drainage, 

Nausea and/or Vomiting


Other/Special Instructions: Transfer to [facility] under the services of Dr. Loomis, 

Attending Psychiatrist





- Discharge Plan


*PRESCRIPTION DRUG MONITORING PROGRAM REVIEWED*: Not Applicable


*COPY OF PRESCRIPTION DRUG MONITORING REPORT IN PATIENT CHRISTINAE: Not Applicable


Oxygen Therapy Mode: Room Air





- Discharge Summary/Plan Comment


DC Time >30 min.: Yes (35 mins)


Discharge Summary/Plan Comment: 


Transfer to [facility] for psych inpatient treatment under the services of Dr. Loomis

, Attending Psych.





- General Info


Date of Service: 01/16/20


Admission Dx/Problem (Free Text: 


 Admission Diagnosis/Problem





Admission Diagnosis/Problem      Diabetic ketoacidosis








Subjective Update: 


Follow Up


Functional Status: Reports: Pain Controlled, Tolerating Diet, Ambulating, 

Urinating.  Denies: New Symptoms





- Review of Systems


General: Denies: Fever, Chills


HEENT: Reports: No Symptoms


Pulmonary: Denies: Shortness of Breath


Cardiovascular: Denies: Chest Pain, Dyspnea on Exertion, Lightheadedness


Gastrointestinal: Denies: Abdominal Pain, Nausea, Vomiting


Genitourinary: Reports: No Symptoms


Musculoskeletal: Reports: No Symptoms


Skin: Reports: Rash


Neurological: Reports: Gait Disturbance


Psychiatric: Denies: Depression, Anxiety, Agitation, Hallucinations


Systems Review Comment: 


No overnight or acute issues. She seems to be at her baseline. Her K improves 

to 3.7. She remains afebrile and comfortable sitting up in chair. She has no 

complaints.








- Patient Data


Vitals - Most Recent: 


 Last Vital Signs











Temp  36.7 C   01/09/20 08:19


 


Pulse  94   01/09/20 07:00


 


Resp  18   01/09/20 08:19


 


BP  93/63   01/09/20 08:19


 


Pulse Ox  92 L  01/09/20 08:19











Weight - Most Recent: 112.219 kg


I&O - Last 24 hours: 


 Intake & Output











 01/08/20 01/09/20 01/09/20





 22:59 06:59 14:59


 


Intake Total 520 400 


 


Balance 520 400 











Lab Results - Last 24 hrs: 


 Laboratory Results - last 24 hr











  01/08/20 01/08/20 01/08/20 Range/Units





  04:30 06:20 09:33 


 


WBC     (3.98-10.04)  K/mm3


 


RBC     (3.98-5.22)  M/mm3


 


Hgb     (11.2-15.7)  gm/dl


 


Hct     (34.1-44.9)  %


 


MCV     (79.4-94.8)  fl


 


MCH     (25.6-32.2)  pg


 


MCHC     (32.2-35.5)  g/dl


 


RDW Std Deviation     (36.4-46.3)  fL


 


Plt Count     (182-369)  K/mm3


 


MPV     (9.4-12.3)  fl


 


Neut % (Auto)     (34.0-71.1)  %


 


Lymph % (Auto)     (19.3-51.7)  %


 


Mono % (Auto)     (4.7-12.5)  %


 


Eos % (Auto)     (0.7-5.8)  


 


Baso % (Auto)     (0.1-1.2)  %


 


Neut # (Auto)     (1.56-6.13)  K/mm3


 


Lymph # (Auto)     (1.18-3.74)  K/mm3


 


Mono # (Auto)     (0.24-0.36)  K/mm3


 


Eos # (Auto)     (0.04-0.36)  K/mm3


 


Baso # (Auto)     (0.01-0.08)  K/mm3


 


Manual Slide Review     


 


Puncture Site     


 


ABG pH     (7.35-7.45)  


 


ABG pCO2     (35.0-45.0)  mmHg


 


ABG pO2     (80.0-100.0)  mmHg


 


ABG HCO3     (22.0-26.0)  meq/L


 


ABG O2 Saturation     (96.0-97.0)  %


 


ABG Base Excess     (-2-2.0)  


 


Yuri Test     


 


A-a Gradient     mmHg


 


O2 Delivery Device     


 


Oxygen Flow Rate     


 


FiO2     (21..00)  %


 


Sodium  144    (136-145)  mEq/L


 


Potassium  3.0 L    (3.5-5.1)  mEq/L


 


Chloride  103    ()  mEq/L


 


Carbon Dioxide  25    (21-32)  mEq/L


 


Anion Gap  19.0 H    (5-15)  


 


BUN  19 H    (7-18)  mg/dL


 


Creatinine  0.9    (0.55-1.02)  mg/dL


 


Est Cr Clr Drug Dosing  58.32    mL/min


 


Estimated GFR (MDRD)  > 60    (>60)  mL/min


 


BUN/Creatinine Ratio  21.1 H    (14-18)  


 


Glucose  206 H    ()  mg/dL


 


POC Glucose   236 H  193 H  ()  mg/dL


 


Calcium  8.2 L    (8.5-10.1)  mg/dL


 


Phosphorus     (2.6-4.7)  mg/dL


 


Magnesium     (1.8-2.4)  mg/dl


 


Total Bilirubin  0.3    (0.2-1.0)  mg/dL


 


AST  64 H    (15-37)  U/L


 


ALT  50    (14-59)  U/L


 


Alkaline Phosphatase  222 H    ()  U/L


 


Total Protein  6.1 L    (6.4-8.2)  g/dl


 


Albumin  2.2 L    (3.4-5.0)  g/dl


 


Globulin  3.9    gm/dL


 


Albumin/Globulin Ratio  0.6 L    (1-2)  














  01/08/20 01/08/20 01/08/20 Range/Units





  11:11 15:59 17:04 


 


WBC     (3.98-10.04)  K/mm3


 


RBC     (3.98-5.22)  M/mm3


 


Hgb     (11.2-15.7)  gm/dl


 


Hct     (34.1-44.9)  %


 


MCV     (79.4-94.8)  fl


 


MCH     (25.6-32.2)  pg


 


MCHC     (32.2-35.5)  g/dl


 


RDW Std Deviation     (36.4-46.3)  fL


 


Plt Count     (182-369)  K/mm3


 


MPV     (9.4-12.3)  fl


 


Neut % (Auto)     (34.0-71.1)  %


 


Lymph % (Auto)     (19.3-51.7)  %


 


Mono % (Auto)     (4.7-12.5)  %


 


Eos % (Auto)     (0.7-5.8)  


 


Baso % (Auto)     (0.1-1.2)  %


 


Neut # (Auto)     (1.56-6.13)  K/mm3


 


Lymph # (Auto)     (1.18-3.74)  K/mm3


 


Mono # (Auto)     (0.24-0.36)  K/mm3


 


Eos # (Auto)     (0.04-0.36)  K/mm3


 


Baso # (Auto)     (0.01-0.08)  K/mm3


 


Manual Slide Review     


 


Puncture Site     


 


ABG pH     (7.35-7.45)  


 


ABG pCO2     (35.0-45.0)  mmHg


 


ABG pO2     (80.0-100.0)  mmHg


 


ABG HCO3     (22.0-26.0)  meq/L


 


ABG O2 Saturation     (96.0-97.0)  %


 


ABG Base Excess     (-2-2.0)  


 


Yuri Test     


 


A-a Gradient     mmHg


 


O2 Delivery Device     


 


Oxygen Flow Rate     


 


FiO2     (21..00)  %


 


Sodium    142  (136-145)  mEq/L


 


Potassium    2.8 L  (3.5-5.1)  mEq/L


 


Chloride    104  ()  mEq/L


 


Carbon Dioxide    31  (21-32)  mEq/L


 


Anion Gap    9.8  (5-15)  


 


BUN    18  (7-18)  mg/dL


 


Creatinine    0.9  (0.55-1.02)  mg/dL


 


Est Cr Clr Drug Dosing    58.32  mL/min


 


Estimated GFR (MDRD)    > 60  (>60)  mL/min


 


BUN/Creatinine Ratio    20.0 H  (14-18)  


 


Glucose    164 H  ()  mg/dL


 


POC Glucose  236 H  192 H   ()  mg/dL


 


Calcium    8.1 L  (8.5-10.1)  mg/dL


 


Phosphorus     (2.6-4.7)  mg/dL


 


Magnesium     (1.8-2.4)  mg/dl


 


Total Bilirubin     (0.2-1.0)  mg/dL


 


AST     (15-37)  U/L


 


ALT     (14-59)  U/L


 


Alkaline Phosphatase     ()  U/L


 


Total Protein     (6.4-8.2)  g/dl


 


Albumin     (3.4-5.0)  g/dl


 


Globulin     gm/dL


 


Albumin/Globulin Ratio     (1-2)  














  01/08/20 01/09/20 01/09/20 Range/Units





  20:23 04:58 04:58 


 


WBC   3.29 L   (3.98-10.04)  K/mm3


 


RBC   4.21   (3.98-5.22)  M/mm3


 


Hgb   11.6  D   (11.2-15.7)  gm/dl


 


Hct   37.0   (34.1-44.9)  %


 


MCV   87.9   (79.4-94.8)  fl


 


MCH   27.6   (25.6-32.2)  pg


 


MCHC   31.4 L   (32.2-35.5)  g/dl


 


RDW Std Deviation   47.3 H   (36.4-46.3)  fL


 


Plt Count   77 L   (182-369)  K/mm3


 


MPV   12.0   (9.4-12.3)  fl


 


Neut % (Auto)   65.4   (34.0-71.1)  %


 


Lymph % (Auto)   24.3   (19.3-51.7)  %


 


Mono % (Auto)   8.5   (4.7-12.5)  %


 


Eos % (Auto)   1.8   (0.7-5.8)  


 


Baso % (Auto)   0.0 L   (0.1-1.2)  %


 


Neut # (Auto)   2.15   (1.56-6.13)  K/mm3


 


Lymph # (Auto)   0.80 L   (1.18-3.74)  K/mm3


 


Mono # (Auto)   0.28   (0.24-0.36)  K/mm3


 


Eos # (Auto)   0.06   (0.04-0.36)  K/mm3


 


Baso # (Auto)   0.00 L   (0.01-0.08)  K/mm3


 


Manual Slide Review   Abnormal smear   


 


Puncture Site     


 


ABG pH     (7.35-7.45)  


 


ABG pCO2     (35.0-45.0)  mmHg


 


ABG pO2     (80.0-100.0)  mmHg


 


ABG HCO3     (22.0-26.0)  meq/L


 


ABG O2 Saturation     (96.0-97.0)  %


 


ABG Base Excess     (-2-2.0)  


 


Yuri Test     


 


A-a Gradient     mmHg


 


O2 Delivery Device     


 


Oxygen Flow Rate     


 


FiO2     (21..00)  %


 


Sodium    142  (136-145)  mEq/L


 


Potassium    3.7  (3.5-5.1)  mEq/L


 


Chloride    106  ()  mEq/L


 


Carbon Dioxide    30  (21-32)  mEq/L


 


Anion Gap    9.7  (5-15)  


 


BUN    18  (7-18)  mg/dL


 


Creatinine    0.9  (0.55-1.02)  mg/dL


 


Est Cr Clr Drug Dosing    58.32  mL/min


 


Estimated GFR (MDRD)    > 60  (>60)  mL/min


 


BUN/Creatinine Ratio    20.0 H  (14-18)  


 


Glucose    184 H  ()  mg/dL


 


POC Glucose  233 H    ()  mg/dL


 


Calcium    8.0 L  (8.5-10.1)  mg/dL


 


Phosphorus    2.8  (2.6-4.7)  mg/dL


 


Magnesium    2.0  (1.8-2.4)  mg/dl


 


Total Bilirubin    0.3  (0.2-1.0)  mg/dL


 


AST    55 H  (15-37)  U/L


 


ALT    45  (14-59)  U/L


 


Alkaline Phosphatase    193 H  ()  U/L


 


Total Protein    5.4 L  (6.4-8.2)  g/dl


 


Albumin    1.8 L  (3.4-5.0)  g/dl


 


Globulin    3.6  gm/dL


 


Albumin/Globulin Ratio    0.5 L  (1-2)  














  01/09/20 01/09/20 01/09/20 Range/Units





  06:26 08:54 08:57 


 


WBC     (3.98-10.04)  K/mm3


 


RBC     (3.98-5.22)  M/mm3


 


Hgb     (11.2-15.7)  gm/dl


 


Hct     (34.1-44.9)  %


 


MCV     (79.4-94.8)  fl


 


MCH     (25.6-32.2)  pg


 


MCHC     (32.2-35.5)  g/dl


 


RDW Std Deviation     (36.4-46.3)  fL


 


Plt Count     (182-369)  K/mm3


 


MPV     (9.4-12.3)  fl


 


Neut % (Auto)     (34.0-71.1)  %


 


Lymph % (Auto)     (19.3-51.7)  %


 


Mono % (Auto)     (4.7-12.5)  %


 


Eos % (Auto)     (0.7-5.8)  


 


Baso % (Auto)     (0.1-1.2)  %


 


Neut # (Auto)     (1.56-6.13)  K/mm3


 


Lymph # (Auto)     (1.18-3.74)  K/mm3


 


Mono # (Auto)     (0.24-0.36)  K/mm3


 


Eos # (Auto)     (0.04-0.36)  K/mm3


 


Baso # (Auto)     (0.01-0.08)  K/mm3


 


Manual Slide Review     


 


Puncture Site   Rt radial   


 


ABG pH   7.42   (7.35-7.45)  


 


ABG pCO2   46.8 H   (35.0-45.0)  mmHg


 


ABG pO2   52.0 L   (80.0-100.0)  mmHg


 


ABG HCO3   29.8 H   (22.0-26.0)  meq/L


 


ABG O2 Saturation   88.0 L   (96.0-97.0)  %


 


ABG Base Excess   5.0 H   (-2-2.0)  


 


Yuri Test   Positive   


 


A-a Gradient   39   mmHg


 


O2 Delivery Device   Room air   


 


Oxygen Flow Rate   0.0   


 


FiO2   21.00   (21..00)  %


 


Sodium     (136-145)  mEq/L


 


Potassium     (3.5-5.1)  mEq/L


 


Chloride     ()  mEq/L


 


Carbon Dioxide     (21-32)  mEq/L


 


Anion Gap     (5-15)  


 


BUN     (7-18)  mg/dL


 


Creatinine     (0.55-1.02)  mg/dL


 


Est Cr Clr Drug Dosing     mL/min


 


Estimated GFR (MDRD)     (>60)  mL/min


 


BUN/Creatinine Ratio     (14-18)  


 


Glucose     ()  mg/dL


 


POC Glucose  184 H   201 H  ()  mg/dL


 


Calcium     (8.5-10.1)  mg/dL


 


Phosphorus     (2.6-4.7)  mg/dL


 


Magnesium     (1.8-2.4)  mg/dl


 


Total Bilirubin     (0.2-1.0)  mg/dL


 


AST     (15-37)  U/L


 


ALT     (14-59)  U/L


 


Alkaline Phosphatase     ()  U/L


 


Total Protein     (6.4-8.2)  g/dl


 


Albumin     (3.4-5.0)  g/dl


 


Globulin     gm/dL


 


Albumin/Globulin Ratio     (1-2)  











[facility] Results - Last 24 hrs: 


 Microbiology











 01/06/20 14:10 Aerobic Blood Culture - Preliminary





 Blood - Venous    NO GROWTH AFTER 2 DAYS





 Anaerobic Blood Culture - Final


 


 01/06/20 12:58 Aerobic Blood Culture - Preliminary





 Blood - Venous - Lab Draw    NO GROWTH AFTER 2 DAYS





 Anaerobic Blood Culture - Preliminary





    NO GROWTH AFTER 2 DAYS











Med Orders - Current: 


 Current Medications





Albuterol/Ipratropium (Duoneb 3.0-0.5 Mg/3 Ml)  3 ml NEB Q4H PRN


   PRN Reason: Shortness Of Breath/wheezing


Alprazolam (Xanax)  0.25 mg PO BID PRN


   PRN Reason: Anxiety


   Last Admin: 01/09/20 08:05 Dose:  0.25 mg


Cyclobenzaprine HCl (Flexeril)  10 mg PO TID PRN


   PRN Reason: Pain


   Last Admin: 01/07/20 13:39 Dose:  10 mg


Fluoxetine HCl (Prozac)  80 mg PO DAILY [facility]


   Last Admin: 01/09/20 08:05 Dose:  80 mg


Guaifenesin/Phenylephrine HCl (Robitussin Dm)  10 ml PO Q6H PRN


   PRN Reason: Cough


   Last Admin: 01/08/20 20:18 Dose:  10 ml


Heparin Sodium (Porcine) (Heparin Sodium)  5,000 units SUBCUT Q8H [facility]


   Last Admin: 01/09/20 02:05 Dose:  5,000 units


Hydromorphone HCl (Dilaudid)  0.5 mg IVPUSH Q2H PRN


   PRN Reason: Pain (severe 7-10)


Promethazine HCl 6.25 mg/ (Sodium Chloride)  50.25 mls @ 100 mls/hr IV Q6H PRN


   PRN Reason: Nausea/Vomiting


Ibuprofen (Motrin)  600 mg PO Q6H PRN


   PRN Reason: Pain (moderate 4-6)


Insulin Glargine (Lantus)  30 unit SUBCUT DAILY [facility]


   Last Admin: 01/09/20 08:05 Dose:  30 units


Insulin Human Lispro (Humalog)  0 unit SUBCUT QIDACANDBED [facility]; Protocol


   Last Admin: 01/09/20 06:27 Dose:  3 units


Mometasone Furoate/Formoterol Fumar (Dulera 100-5 Mcg)  2 puff IH BID [facility]


   Last Admin: 01/09/20 08:10 Dose:  2 puff


Nystatin (Nystop)  3 gm TOP TID [facility]


   Last Admin: 01/09/20 08:05 Dose:  1 applic


Ondansetron HCl (Zofran)  4 mg IV Q6H PRN


   PRN Reason: Nausea/Vomiting


Oseltamivir Phosphate (Tamiflu)  75 mg PO BID [facility]


   Stop: 01/13/20 09:01


   Last Admin: 01/09/20 08:05 Dose:  75 mg


[facility]


   Last Admin: 01/09/20 06:28 Dose:  40 mg


Dulaglutide [ Trulicity] Injection 1.5 Mg Pen  0 each SUBCUT TU [facility]


   Last Admin: 01/07/20 21:37 Dose:  Not Given


Pregabalin (Lyrica)  300 mg PO BID [facility]


   Last Admin: 01/09/20 08:05 Dose:  300 mg


Rosuvastatin Calcium (Crestor)  20 mg PO BEDTIME [facility]


   Last Admin: 01/08/20 20:18 Dose:  20 mg





Discontinued Medications





Aripiprazole (Abilify)  40 mg PO ONETIME ONE


   Stop: 01/07/20 13:31


   Last Admin: 01/07/20 13:39 Dose:  40 mg


Aripiprazole (Abilify)  40 mg PO DAILY [facility]


   Last Admin: 01/08/20 08:05 Dose:  40 mg


Haloperidol Lactate (Haldol)  5 mg IM ONETIME ONE


   Stop: 01/07/20 13:38


   Last Admin: 01/07/20 14:19 Dose:  Not Given


Hydromorphone HCl (Dilaudid)  0.5 mg IVPUSH ONETIME ONE


   Stop: 01/06/20 14:21


   Last Admin: 01/06/20 14:47 Dose:  0.5 mg


Hydroxyzine HCl (Atarax)  25 mg PO TID PRN


   PRN Reason: anxiety


Sodium Chloride (Normal Saline)  1,000 mls @ 999 mls/hr IV ASDIRECTED [facility]


   Last Admin: 01/06/20 13:20 Dose:  999 mls/hr


Insulin Human Regular 100 unit (/ Sodium Chloride)  100 mls @ 10.7 mls/hr IV 

TITRATE [facility]; Protocol


Insulin Human Regular 100 unit (/ Sodium Chloride)  100 mls @ 10 mls/hr IV 

TITRATE [facility]; Protocol


   Last Titration: 01/07/20 09:05 Dose:  0 unit/hr, 0 mls/hr


Sodium Chloride (Normal Saline)  1,000 mls @ 999 mls/hr IV ASDIRECTED [facility]


   Last Admin: 01/06/20 15:30 Dose:  999 mls/hr


Potassium Chloride 10 meq/ (Premix)  100 mls @ 100 mls/hr IV Q1H [facility]


   Stop: 01/06/20 18:14


   Last Admin: 01/06/20 18:29 Dose:  100 mls/hr


Lactated Ringer's (Ringers, Lactated)  1,000 mls @ 125 mls/hr IV ASDIRECTED [facility]


   Last Admin: 01/07/20 02:39 Dose:  125 mls/hr


Sodium Bicarbonate 150 meq/ (Dextrose/Water)  1,150 mls @ 50 mls/hr IV ONETIME 

ONE


   Stop: 01/07/20 17:11


   Last Admin: 01/06/20 18:31 Dose:  50 mls/hr


Dextrose/Water (Dextrose 5% In Water)  1,000 mls @ 150 mls/hr IV ASDIRECTED [facility]


   Last Admin: 01/07/20 03:36 Dose:  150 mls/hr


Potassium Chloride 10 meq/ (Premix)  100 mls @ 100 mls/hr IV Q1H [facility]


   Stop: 01/07/20 02:59


   Last Admin: 01/07/20 02:37 Dose:  100 mls/hr


Magnesium Sulfate 2 gm/ Premix  50 mls @ 25 mls/hr IV ONETIME ONE


   Stop: 01/07/20 09:05


   Last Admin: 01/07/20 08:58 Dose:  25 mls/hr


Lactated Ringer's (Ringers, Lactated)  1,000 mls @ 25 mls/hr IV ASDIRECTED [facility]


   Last Admin: 01/07/20 09:26 Dose:  25 mls/hr


Insulin Glargine (Lantus)  20 unit SUBCUT DAILY [facility]


   Last Admin: 01/07/20 09:20 Dose:  20 units


Insulin Glargine (Lantus)  25 unit SUBCUT DAILY [facility]


   Last Admin: 01/08/20 08:04 Dose:  25 units


Non-Formulary Medication (Aripiprazole)  10 mg PO DAILY [facility]


Non-Formulary Medication (Clonidine.)  0.1 mg PO BID [facility]


Olanzapine (Zyprexa)  10 mg IM ONETIME ONE


   Stop: 01/07/20 12:01


   Last Admin: 01/07/20 11:56 Dose:  10 mg


Pantoprazole Sodium (Protonix Iv***)  40 mg IV Q12H [facility]


   Last Admin: 01/07/20 20:29 Dose:  Not Given


Potassium Chloride (Klor-Con M20)  60 meq PO ONETIME ONE


   Stop: 01/06/20 22:58


   Last Admin: 01/06/20 23:10 Dose:  60 meq


Potassium Chloride (Klor-Con M20)  80 meq PO BID [facility]


   Stop: 01/09/20 09:01


   Last Admin: 01/09/20 08:04 Dose:  80 meq


Risperidone (Risperidal)  3 mg PO BID [facility]


   Last Admin: 01/08/20 08:06 Dose:  3 mg


Sodium Phosphate (Neutra-Phos)  500 mg PO ONETIME ONE


   Stop: 01/07/20 07:07


   Last Admin: 01/07/20 09:13 Dose:  500 mg


Sodium Phosphate (Neutra-Phos)  250 mg PO BID [facility]


   Stop: 01/08/20 21:01


   Last Admin: 01/08/20 20:17 Dose:  250 mg











- Exam


General: Reports: Alert, Cooperative, No Acute Distress, Other (Morbidly Obese)


HEENT: Reports: Pupils Equal, Pupils Reactive, EOMI, Mucous Membr. Moist/Pink


Neck: Reports: Supple


Lungs: Reports: Normal Respiratory Effort, Decreased Breath Sounds


Cardiovascular: Reports: Regular Rate, Regular Rhythm


GI/Abdominal Exam: Normal Bowel Sounds, Soft, Non-Tender, No Organomegaly, No 

Distention, No Abnormal Bruit, Other (Obese)


 (Female) Exam: Deferred


Rectal (Female) Exam: Deferred


Back Exam: Reports: Normal Inspection, Decreased Range of Motion


Extremities: Normal Inspection, Normal Range of Motion, Non-Tender, No Pedal 

Edema, Normal Capillary Refill


Skin: Reports: Warm, Dry, Rash, Other (dermatitis/rash on abdominal folds and 

inguinal region as well as inner thighs)


Neurological: Reports: No New Focal Deficit


Psy/Mental Status: Reports: Alert, Normal Affect, Normal Mood

## 2020-01-09 NOTE — [id number]
**Discharge Summary





- Hospital Course


Diagnosis: Stroke: No


Modified Walker Scale: No Symptoms at All


Modified [place] Scale Score: 0





- Discharge Data


Discharge Disposition: DC/Tfer to Acute Hospital 02


Condition: Good





- Referral to Home Health


Primary Care Physician: 


Owen Warner MD








- Patient Summary/Data


Consults: 


 Consultations





01/06/20 17:13


Consult to Case Management/ [CONS] Routine 


Consult to Diabetic Nurse Specialist [CONS] Routine 


Consult to Dietician [CONS] Routine 


Consult to Spiritual Care [CONS] Routine 


OT Evaluation and Treatment [CONS] Routine 


PT Evaluation and Treatment [CONS] Routine 





01/08/20 11:52


Consult to Physician [CONS] Stat 














- Patient Instructions


Diet: Heart Healthy Diet, Diabetic Diet, Weight Loss Diet


Activity: As Tolerated


Driving: Do Not Drive


Showering/Bathing: May Shower


Notify Provider of: Fever, Increased Pain, Swelling and Redness, Drainage, 

Nausea and/or Vomiting


Other/Special Instructions: Transfer to [facility] under the services of Dr. Loomis, 

Attending Psychiatrist





- Discharge Plan


*PRESCRIPTION DRUG MONITORING PROGRAM REVIEWED*: Not Applicable


*COPY OF PRESCRIPTION DRUG MONITORING REPORT IN PATIENT CHRISTIANE: Not Applicable


Home Medications: 


 Home Meds





Cyclobenzaprine [Flexeril] 10 mg PO TID PRN 12/28/16 [History]


Pregabalin [Lyrica] 300 mg PO BID 12/28/16 [History]


Dulaglutide [Trulicity] 1.5 mg SQ TU 10/24/19 [History]


Fluticasone/Vilanterol [Breo Ellipta 100-25 MCG Inhalation Kit] 1 each IH 

ASDIRECTED 01/06/20 [History]


Rosuvastatin Calcium 20 mg PO BEDTIME 01/06/20 [History]


ALPRAZolam [Alprazolam] 0.25 mg PO BID PRN 01/07/20 [History]


FLUoxetine HCl [Fluoxetine HCl] 80 mg PO DAILY 01/07/20 [History]


hydrOXYzine HCL [Hydroxyzine HCl] 25 mg PO TID PRN 01/07/20 [History]


Insulin Glarg,Human.Rec.Analog [Lantus] 30 unit SUBCUT DAILY  ml 01/09/20 [Rx]


Insulin Lispro [HumaLOG] See Protocol SUBCUT QIDACANDBED #1 vial 01/09/20 [Rx]


Nystatin [Nystop] 3 gm TOP TID  bottle 01/09/20 [Rx]


Oseltamivir [Tamiflu] 75 mg PO BID  cap 01/09/20 [Rx]








Oxygen Therapy Mode: Room Air


Patient Handouts:  Acute Kidney Injury, Adult, Influenza, Adult, Easy-to-Read, 

Skin Yeast Infection, Delirium, Preventing Diabetic Ketoacidosis, Obesity, Adult

, Easy-to-Read


Referrals: 


Owen Warner MD [Primary Care Provider] - 





- Patient Data


Vitals - Most Recent: 


 Last Vital Signs











Temp  98.1 F   01/09/20 08:19


 


Pulse  94   01/09/20 07:00


 


Resp  18   01/09/20 08:19


 


BP  93/63   01/09/20 08:19


 


Pulse Ox  92 L  01/09/20 08:19











Weight - Most Recent: 247 lb 6.4 oz


I&O - Last 24 hours: 


 Intake & Output











 01/08/20 01/09/20 01/09/20





 22:59 06:59 14:59


 


Intake Total 520 400 


 


Balance 520 400 











Lab Results - Last 24 hrs: 


 Laboratory Results - last 24 hr











  01/08/20 01/08/20 01/08/20 Range/Units





  04:30 06:20 09:33 


 


WBC     (3.98-10.04)  K/mm3


 


RBC     (3.98-5.22)  M/mm3


 


Hgb     (11.2-15.7)  gm/dl


 


Hct     (34.1-44.9)  %


 


MCV     (79.4-94.8)  fl


 


MCH     (25.6-32.2)  pg


 


MCHC     (32.2-35.5)  g/dl


 


RDW Std Deviation     (36.4-46.3)  fL


 


Plt Count     (182-369)  K/mm3


 


MPV     (9.4-12.3)  fl


 


Neut % (Auto)     (34.0-71.1)  %


 


Lymph % (Auto)     (19.3-51.7)  %


 


Mono % (Auto)     (4.7-12.5)  %


 


Eos % (Auto)     (0.7-5.8)  


 


Baso % (Auto)     (0.1-1.2)  %


 


Neut # (Auto)     (1.56-6.13)  K/mm3


 


Lymph # (Auto)     (1.18-3.74)  K/mm3


 


Mono # (Auto)     (0.24-0.36)  K/mm3


 


Eos # (Auto)     (0.04-0.36)  K/mm3


 


Baso # (Auto)     (0.01-0.08)  K/mm3


 


Manual Slide Review     


 


Puncture Site     


 


ABG pH     (7.35-7.45)  


 


ABG pCO2     (35.0-45.0)  mmHg


 


ABG pO2     (80.0-100.0)  mmHg


 


ABG HCO3     (22.0-26.0)  meq/L


 


ABG O2 Saturation     (96.0-97.0)  %


 


ABG Base Excess     (-2-2.0)  


 


Yuri Test     


 


A-a Gradient     mmHg


 


O2 Delivery Device     


 


Oxygen Flow Rate     


 


FiO2     (21..00)  %


 


Sodium  144    (136-145)  mEq/L


 


Potassium  3.0 L    (3.5-5.1)  mEq/L


 


Chloride  103    ()  mEq/L


 


Carbon Dioxide  25    (21-32)  mEq/L


 


Anion Gap  19.0 H    (5-15)  


 


BUN  19 H    (7-18)  mg/dL


 


Creatinine  0.9    (0.55-1.02)  mg/dL


 


Est Cr Clr Drug Dosing  58.32    mL/min


 


Estimated GFR (MDRD)  > 60    (>60)  mL/min


 


BUN/Creatinine Ratio  21.1 H    (14-18)  


 


Glucose  206 H    ()  mg/dL


 


POC Glucose   236 H  193 H  ()  mg/dL


 


Calcium  8.2 L    (8.5-10.1)  mg/dL


 


Phosphorus     (2.6-4.7)  mg/dL


 


Magnesium     (1.8-2.4)  mg/dl


 


Total Bilirubin  0.3    (0.2-1.0)  mg/dL


 


AST  64 H    (15-37)  U/L


 


ALT  50    (14-59)  U/L


 


Alkaline Phosphatase  222 H    ()  U/L


 


Total Protein  6.1 L    (6.4-8.2)  g/dl


 


Albumin  2.2 L    (3.4-5.0)  g/dl


 


Globulin  3.9    gm/dL


 


Albumin/Globulin Ratio  0.6 L    (1-2)  














  01/08/20 01/08/20 01/08/20 Range/Units





  11:11 15:59 17:04 


 


WBC     (3.98-10.04)  K/mm3


 


RBC     (3.98-5.22)  M/mm3


 


Hgb     (11.2-15.7)  gm/dl


 


Hct     (34.1-44.9)  %


 


MCV     (79.4-94.8)  fl


 


MCH     (25.6-32.2)  pg


 


MCHC     (32.2-35.5)  g/dl


 


RDW Std Deviation     (36.4-46.3)  fL


 


Plt Count     (182-369)  K/mm3


 


MPV     (9.4-12.3)  fl


 


Neut % (Auto)     (34.0-71.1)  %


 


Lymph % (Auto)     (19.3-51.7)  %


 


Mono % (Auto)     (4.7-12.5)  %


 


Eos % (Auto)     (0.7-5.8)  


 


Baso % (Auto)     (0.1-1.2)  %


 


Neut # (Auto)     (1.56-6.13)  K/mm3


 


Lymph # (Auto)     (1.18-3.74)  K/mm3


 


Mono # (Auto)     (0.24-0.36)  K/mm3


 


Eos # (Auto)     (0.04-0.36)  K/mm3


 


Baso # (Auto)     (0.01-0.08)  K/mm3


 


Manual Slide Review     


 


Puncture Site     


 


ABG pH     (7.35-7.45)  


 


ABG pCO2     (35.0-45.0)  mmHg


 


ABG pO2     (80.0-100.0)  mmHg


 


ABG HCO3     (22.0-26.0)  meq/L


 


ABG O2 Saturation     (96.0-97.0)  %


 


ABG Base Excess     (-2-2.0)  


 


Yuri Test     


 


A-a Gradient     mmHg


 


O2 Delivery Device     


 


Oxygen Flow Rate     


 


FiO2     (21..00)  %


 


Sodium    142  (136-145)  mEq/L


 


Potassium    2.8 L  (3.5-5.1)  mEq/L


 


Chloride    104  ()  mEq/L


 


Carbon Dioxide    31  (21-32)  mEq/L


 


Anion Gap    9.8  (5-15)  


 


BUN    18  (7-18)  mg/dL


 


Creatinine    0.9  (0.55-1.02)  mg/dL


 


Est Cr Clr Drug Dosing    58.32  mL/min


 


Estimated GFR (MDRD)    > 60  (>60)  mL/min


 


BUN/Creatinine Ratio    20.0 H  (14-18)  


 


Glucose    164 H  ()  mg/dL


 


POC Glucose  236 H  192 H   ()  mg/dL


 


Calcium    8.1 L  (8.5-10.1)  mg/dL


 


Phosphorus     (2.6-4.7)  mg/dL


 


Magnesium     (1.8-2.4)  mg/dl


 


Total Bilirubin     (0.2-1.0)  mg/dL


 


AST     (15-37)  U/L


 


ALT     (14-59)  U/L


 


Alkaline Phosphatase     ()  U/L


 


Total Protein     (6.4-8.2)  g/dl


 


Albumin     (3.4-5.0)  g/dl


 


Globulin     gm/dL


 


Albumin/Globulin Ratio     (1-2)  














  01/08/20 01/09/20 01/09/20 Range/Units





  20:23 04:58 04:58 


 


WBC   3.29 L   (3.98-10.04)  K/mm3


 


RBC   4.21   (3.98-5.22)  M/mm3


 


Hgb   11.6  D   (11.2-15.7)  gm/dl


 


Hct   37.0   (34.1-44.9)  %


 


MCV   87.9   (79.4-94.8)  fl


 


MCH   27.6   (25.6-32.2)  pg


 


MCHC   31.4 L   (32.2-35.5)  g/dl


 


RDW Std Deviation   47.3 H   (36.4-46.3)  fL


 


Plt Count   77 L   (182-369)  K/mm3


 


MPV   12.0   (9.4-12.3)  fl


 


Neut % (Auto)   65.4   (34.0-71.1)  %


 


Lymph % (Auto)   24.3   (19.3-51.7)  %


 


Mono % (Auto)   8.5   (4.7-12.5)  %


 


Eos % (Auto)   1.8   (0.7-5.8)  


 


Baso % (Auto)   0.0 L   (0.1-1.2)  %


 


Neut # (Auto)   2.15   (1.56-6.13)  K/mm3


 


Lymph # (Auto)   0.80 L   (1.18-3.74)  K/mm3


 


Mono # (Auto)   0.28   (0.24-0.36)  K/mm3


 


Eos # (Auto)   0.06   (0.04-0.36)  K/mm3


 


Baso # (Auto)   0.00 L   (0.01-0.08)  K/mm3


 


Manual Slide Review   Abnormal smear   


 


Puncture Site     


 


ABG pH     (7.35-7.45)  


 


ABG pCO2     (35.0-45.0)  mmHg


 


ABG pO2     (80.0-100.0)  mmHg


 


ABG HCO3     (22.0-26.0)  meq/L


 


ABG O2 Saturation     (96.0-97.0)  %


 


ABG Base Excess     (-2-2.0)  


 


Yuri Test     


 


A-a Gradient     mmHg


 


O2 Delivery Device     


 


Oxygen Flow Rate     


 


FiO2     (21..00)  %


 


Sodium    142  (136-145)  mEq/L


 


Potassium    3.7  (3.5-5.1)  mEq/L


 


Chloride    106  ()  mEq/L


 


Carbon Dioxide    30  (21-32)  mEq/L


 


Anion Gap    9.7  (5-15)  


 


BUN    18  (7-18)  mg/dL


 


Creatinine    0.9  (0.55-1.02)  mg/dL


 


Est Cr Clr Drug Dosing    58.32  mL/min


 


Estimated GFR (MDRD)    > 60  (>60)  mL/min


 


BUN/Creatinine Ratio    20.0 H  (14-18)  


 


Glucose    184 H  ()  mg/dL


 


POC Glucose  233 H    ()  mg/dL


 


Calcium    8.0 L  (8.5-10.1)  mg/dL


 


Phosphorus    2.8  (2.6-4.7)  mg/dL


 


Magnesium    2.0  (1.8-2.4)  mg/dl


 


Total Bilirubin    0.3  (0.2-1.0)  mg/dL


 


AST    55 H  (15-37)  U/L


 


ALT    45  (14-59)  U/L


 


Alkaline Phosphatase    193 H  ()  U/L


 


Total Protein    5.4 L  (6.4-8.2)  g/dl


 


Albumin    1.8 L  (3.4-5.0)  g/dl


 


Globulin    3.6  gm/dL


 


Albumin/Globulin Ratio    0.5 L  (1-2)  














  01/09/20 01/09/20 01/09/20 Range/Units





  06:26 08:54 08:57 


 


WBC     (3.98-10.04)  K/mm3


 


RBC     (3.98-5.22)  M/mm3


 


Hgb     (11.2-15.7)  gm/dl


 


Hct     (34.1-44.9)  %


 


MCV     (79.4-94.8)  fl


 


MCH     (25.6-32.2)  pg


 


MCHC     (32.2-35.5)  g/dl


 


RDW Std Deviation     (36.4-46.3)  fL


 


Plt Count     (182-369)  K/mm3


 


MPV     (9.4-12.3)  fl


 


Neut % (Auto)     (34.0-71.1)  %


 


Lymph % (Auto)     (19.3-51.7)  %


 


Mono % (Auto)     (4.7-12.5)  %


 


Eos % (Auto)     (0.7-5.8)  


 


Baso % (Auto)     (0.1-1.2)  %


 


Neut # (Auto)     (1.56-6.13)  K/mm3


 


Lymph # (Auto)     (1.18-3.74)  K/mm3


 


Mono # (Auto)     (0.24-0.36)  K/mm3


 


Eos # (Auto)     (0.04-0.36)  K/mm3


 


Baso # (Auto)     (0.01-0.08)  K/mm3


 


Manual Slide Review     


 


Puncture Site   Rt radial   


 


ABG pH   7.42   (7.35-7.45)  


 


ABG pCO2   46.8 H   (35.0-45.0)  mmHg


 


ABG pO2   52.0 L   (80.0-100.0)  mmHg


 


ABG HCO3   29.8 H   (22.0-26.0)  meq/L


 


ABG O2 Saturation   88.0 L   (96.0-97.0)  %


 


ABG Base Excess   5.0 H   (-2-2.0)  


 


Yuri Test   Positive   


 


A-a Gradient   39   mmHg


 


O2 Delivery Device   Room air   


 


Oxygen Flow Rate   0.0   


 


FiO2   21.00   (21..00)  %


 


Sodium     (136-145)  mEq/L


 


Potassium     (3.5-5.1)  mEq/L


 


Chloride     ()  mEq/L


 


Carbon Dioxide     (21-32)  mEq/L


 


Anion Gap     (5-15)  


 


BUN     (7-18)  mg/dL


 


Creatinine     (0.55-1.02)  mg/dL


 


Est Cr Clr Drug Dosing     mL/min


 


Estimated GFR (MDRD)     (>60)  mL/min


 


BUN/Creatinine Ratio     (14-18)  


 


Glucose     ()  mg/dL


 


POC Glucose  184 H   201 H  ()  mg/dL


 


Calcium     (8.5-10.1)  mg/dL


 


Phosphorus     (2.6-4.7)  mg/dL


 


Magnesium     (1.8-2.4)  mg/dl


 


Total Bilirubin     (0.2-1.0)  mg/dL


 


AST     (15-37)  U/L


 


ALT     (14-59)  U/L


 


Alkaline Phosphatase     ()  U/L


 


Total Protein     (6.4-8.2)  g/dl


 


Albumin     (3.4-5.0)  g/dl


 


Globulin     gm/dL


 


Albumin/Globulin Ratio     (1-2)  











LOYDA Results - Last 24 hrs: 


 Microbiology











 01/06/20 14:10 Aerobic Blood Culture - Preliminary





 Blood - Venous    NO GROWTH AFTER 2 DAYS





 Anaerobic Blood Culture - Final


 


 01/06/20 12:58 Aerobic Blood Culture - Preliminary





 Blood - Venous - Lab Draw    NO GROWTH AFTER 2 DAYS





 Anaerobic Blood Culture - Preliminary





    NO GROWTH AFTER 2 DAYS











Med Orders - Current: 


 Current Medications





Albuterol/Ipratropium (Duoneb 3.0-0.5 Mg/3 Ml)  3 ml NEB Q4H PRN


   PRN Reason: Shortness Of Breath/wheezing


Alprazolam (Xanax)  0.25 mg PO BID PRN


   PRN Reason: Anxiety


   Last Admin: 01/09/20 08:05 Dose:  0.25 mg


Cyclobenzaprine HCl (Flexeril)  10 mg PO TID PRN


   PRN Reason: Pain


   Last Admin: 01/07/20 13:39 Dose:  10 mg


Fluoxetine HCl (Prozac)  80 mg PO DAILY [facility]


   Last Admin: 01/09/20 08:05 Dose:  80 mg


Guaifenesin/Phenylephrine HCl (Robitussin Dm)  10 ml PO Q6H PRN


   PRN Reason: Cough


   Last Admin: 01/08/20 20:18 Dose:  10 ml


Heparin Sodium (Porcine) (Heparin Sodium)  5,000 units SUBCUT Q8H [facility]


   Last Admin: 01/09/20 02:05 Dose:  5,000 units


Hydromorphone HCl (Dilaudid)  0.5 mg IVPUSH Q2H PRN


   PRN Reason: Pain (severe 7-10)


Promethazine HCl 6.25 mg/ (Sodium Chloride)  50.25 mls @ 100 mls/hr IV Q6H PRN


   PRN Reason: Nausea/Vomiting


Ibuprofen (Motrin)  600 mg PO Q6H PRN


   PRN Reason: Pain (moderate 4-6)


Insulin Glargine (Lantus)  30 unit SUBCUT DAILY [facility]


   Last Admin: 01/09/20 08:05 Dose:  30 units


Insulin Human Lispro (Humalog)  0 unit SUBCUT QIDACANDBED [facility]; Protocol


   Last Admin: 01/09/20 06:27 Dose:  3 units


Mometasone Furoate/Formoterol Fumar (Dulera 100-5 Mcg)  2 puff IH BID [facility]


   Last Admin: 01/09/20 08:10 Dose:  2 puff


Nystatin (Nystop)  3 gm TOP TID [facility]


   Last Admin: 01/09/20 08:05 Dose:  1 applic


Ondansetron HCl (Zofran)  4 mg IV Q6H PRN


   PRN Reason: Nausea/Vomiting


Oseltamivir Phosphate (Tamiflu)  75 mg PO BID [facility]


   Stop: 01/13/20 09:01


   Last Admin: 01/09/20 08:05 Dose:  75 mg


Pantoprazole Sodium (Protonix***)  40 mg PO BIDAC [facility]


   Last Admin: 01/09/20 06:28 Dose:  40 mg


Dulaglutide [ Trulicity] Injection 1.5 Mg Pen  0 each SUBCUT TU [facility]


   Last Admin: 01/07/20 21:37 Dose:  Not Given


Pregabalin (Lyrica)  300 mg PO BID [facility]


   Last Admin: 01/09/20 08:05 Dose:  300 mg


Rosuvastatin Calcium (Crestor)  20 mg PO BEDTIME [facility]


   Last Admin: 01/08/20 20:18 Dose:  20 mg





Discontinued Medications





Aripiprazole (Abilify)  40 mg PO ONETIME ONE


   Stop: 01/07/20 13:31


   Last Admin: 01/07/20 13:39 Dose:  40 mg


Aripiprazole (Abilify)  40 mg PO DAILY [facility]


   Last Admin: 01/08/20 08:05 Dose:  40 mg


Haloperidol Lactate (Haldol)  5 mg IM ONETIME ONE


   Stop: 01/07/20 13:38


   Last Admin: 01/07/20 14:19 Dose:  Not Given


Hydromorphone HCl (Dilaudid)  0.5 mg IVPUSH ONETIME ONE


   Stop: 01/06/20 14:21


   Last Admin: 01/06/20 14:47 Dose:  0.5 mg


Hydroxyzine HCl (Atarax)  25 mg PO TID PRN


   PRN Reason: anxiety


Sodium Chloride (Normal Saline)  1,000 mls @ 999 mls/hr IV ASDIRECTED [facility]


   Last Admin: 01/06/20 13:20 Dose:  999 mls/hr


Insulin Human Regular 100 unit (/ Sodium Chloride)  100 mls @ 10.7 mls/hr IV 

TITRATE [facility]; Protocol


Insulin Human Regular 100 unit (/ Sodium Chloride)  100 mls @ 10 mls/hr IV 

TITRATE [facility]; Protocol


   Last Titration: 01/07/20 09:05 Dose:  0 unit/hr, 0 mls/hr


Sodium Chloride (Normal Saline)  1,000 mls @ 999 mls/hr IV ASDIRECTED [facility]


   Last Admin: 01/06/20 15:30 Dose:  999 mls/hr


Potassium Chloride 10 meq/ (Premix)  100 mls @ 100 mls/hr IV Q1H [facility]


   Stop: 01/06/20 18:14


   Last Admin: 01/06/20 18:29 Dose:  100 mls/hr


Lactated Ringer's (Ringers, Lactated)  1,000 mls @ 125 mls/hr IV ASDIRECTED [facility]


   Last Admin: 01/07/20 02:39 Dose:  125 mls/hr


Sodium Bicarbonate 150 meq/ (Dextrose/Water)  1,150 mls @ 50 mls/hr IV ONETIME 

ONE


   Stop: 01/07/20 17:11


   Last Admin: 01/06/20 18:31 Dose:  50 mls/hr


Dextrose/Water (Dextrose 5% In Water)  1,000 mls @ 150 mls/hr IV ASDIRECTED [facility]


   Last Admin: 01/07/20 03:36 Dose:  150 mls/hr


Potassium Chloride 10 meq/ (Premix)  100 mls @ 100 mls/hr IV Q1H [facility]


   Stop: 01/07/20 02:59


   Last Admin: 01/07/20 02:37 Dose:  100 mls/hr


Magnesium Sulfate 2 gm/ Premix  50 mls @ 25 mls/hr IV ONETIME ONE


   Stop: 01/07/20 09:05


   Last Admin: 01/07/20 08:58 Dose:  25 mls/hr


Lactated Ringer's (Ringers, Lactated)  1,000 mls @ 25 mls/hr IV ASDIRECTED [facility]


   Last Admin: 01/07/20 09:26 Dose:  25 mls/hr


Insulin Glargine (Lantus)  20 unit SUBCUT DAILY [facility]


   Last Admin: 01/07/20 09:20 Dose:  20 units


Insulin Glargine (Lantus)  25 unit SUBCUT DAILY [facility]


   Last Admin: 01/08/20 08:04 Dose:  25 units


Non-Formulary Medication (Aripiprazole)  10 mg PO DAILY [facility]


Non-Formulary Medication (Clonidine.)  0.1 mg PO BID [facility]


Olanzapine (Zyprexa)  10 mg IM ONETIME ONE


   Stop: 01/07/20 12:01


   Last Admin: 01/07/20 11:56 Dose:  10 mg


Pantoprazole Sodium (Protonix Iv***)  40 mg IV Q12H [facility]


   Last Admin: 01/07/20 20:29 Dose:  Not Given


Potassium Chloride (Klor-Con M20)  60 meq PO ONETIME ONE


   Stop: 01/06/20 22:58


   Last Admin: 01/06/20 23:10 Dose:  60 meq


Potassium Chloride (Klor-Con M20)  80 meq PO BID [facility]


   Stop: 01/09/20 09:01


   Last Admin: 01/09/20 08:04 Dose:  80 meq


Risperidone (Risperidal)  3 mg PO BID [facility]


   Last Admin: 01/08/20 08:06 Dose:  3 mg


Sodium Phosphate (Neutra-Phos)  500 mg PO ONETIME ONE


   Stop: 01/07/20 07:07


   Last Admin: 01/07/20 09:13 Dose:  500 mg


Sodium Phosphate (Neutra-Phos)  250 mg PO BID [facility]


   Stop: 01/08/20 21:01


   Last Admin: 01/08/20 20:17 Dose:  250 mg

## 2020-06-13 ENCOUNTER — HOSPITAL ENCOUNTER (OUTPATIENT)
Dept: HOSPITAL 41 - JD.ED | Age: 65
Setting detail: OBSERVATION
LOS: 2 days | Discharge: PSYCHIATRIC OR SUBSTANCE USE FACILITY | End: 2020-06-15
Attending: INTERNAL MEDICINE | Admitting: INTERNAL MEDICINE
Payer: MEDICARE

## 2020-06-13 DIAGNOSIS — E78.00: ICD-10-CM

## 2020-06-13 DIAGNOSIS — J44.9: ICD-10-CM

## 2020-06-13 DIAGNOSIS — Z79.899: ICD-10-CM

## 2020-06-13 DIAGNOSIS — Z79.4: ICD-10-CM

## 2020-06-13 DIAGNOSIS — E66.9: ICD-10-CM

## 2020-06-13 DIAGNOSIS — E03.9: ICD-10-CM

## 2020-06-13 DIAGNOSIS — E78.5: ICD-10-CM

## 2020-06-13 DIAGNOSIS — Z87.891: ICD-10-CM

## 2020-06-13 DIAGNOSIS — Z65.8: ICD-10-CM

## 2020-06-13 DIAGNOSIS — Z88.5: ICD-10-CM

## 2020-06-13 DIAGNOSIS — F32.89: ICD-10-CM

## 2020-06-13 DIAGNOSIS — F20.89: Primary | ICD-10-CM

## 2020-06-13 DIAGNOSIS — F43.10: ICD-10-CM

## 2020-06-13 DIAGNOSIS — E11.9: ICD-10-CM

## 2020-06-13 DIAGNOSIS — Z79.890: ICD-10-CM

## 2020-06-13 PROCEDURE — 84466 ASSAY OF TRANSFERRIN: CPT

## 2020-06-13 PROCEDURE — 82962 GLUCOSE BLOOD TEST: CPT

## 2020-06-13 PROCEDURE — 85025 COMPLETE CBC W/AUTO DIFF WBC: CPT

## 2020-06-13 PROCEDURE — 83540 ASSAY OF IRON: CPT

## 2020-06-13 PROCEDURE — 83036 HEMOGLOBIN GLYCOSYLATED A1C: CPT

## 2020-06-13 PROCEDURE — 97530 THERAPEUTIC ACTIVITIES: CPT

## 2020-06-13 PROCEDURE — 97161 PT EVAL LOW COMPLEX 20 MIN: CPT

## 2020-06-13 PROCEDURE — 84443 ASSAY THYROID STIM HORMONE: CPT

## 2020-06-13 PROCEDURE — 82746 ASSAY OF FOLIC ACID SERUM: CPT

## 2020-06-13 PROCEDURE — 97116 GAIT TRAINING THERAPY: CPT

## 2020-06-13 PROCEDURE — 82607 VITAMIN B-12: CPT

## 2020-06-13 PROCEDURE — 84100 ASSAY OF PHOSPHORUS: CPT

## 2020-06-13 PROCEDURE — 83735 ASSAY OF MAGNESIUM: CPT

## 2020-06-13 PROCEDURE — 94761 N-INVAS EAR/PLS OXIMETRY MLT: CPT

## 2020-06-13 PROCEDURE — 82306 VITAMIN D 25 HYDROXY: CPT

## 2020-06-13 PROCEDURE — 94760 N-INVAS EAR/PLS OXIMETRY 1: CPT

## 2020-06-13 PROCEDURE — 99285 EMERGENCY DEPT VISIT HI MDM: CPT

## 2020-06-13 PROCEDURE — G0378 HOSPITAL OBSERVATION PER HR: HCPCS

## 2020-06-13 PROCEDURE — 36415 COLL VENOUS BLD VENIPUNCTURE: CPT

## 2020-06-13 PROCEDURE — 96372 THER/PROPH/DIAG INJ SC/IM: CPT

## 2020-06-13 PROCEDURE — 94640 AIRWAY INHALATION TREATMENT: CPT

## 2020-06-13 PROCEDURE — 80053 COMPREHEN METABOLIC PANEL: CPT

## 2020-06-13 PROCEDURE — 97165 OT EVAL LOW COMPLEX 30 MIN: CPT

## 2020-06-13 NOTE — EDM.PDOCBH
ED HPI GENERAL MEDICAL PROBLEM





- General


Chief Complaint: Behavioral/Psych


Stated Complaint: MENTAL HEALTH ISSUES


Time Seen by Provider: 06/13/20 22:51


Source of Information: Reports: Patient, Other (Friend)


History Limitations: Reports: No Limitations





- History of Present Illness


INITIAL COMMENTS - FREE TEXT/NARRATIVE: 





Mrs. Mcclain is a very pleasant 64-year-old woman with a past medical history 

significant for schizophrenia and diabetes, who is now brought to the ED by a 

friend of hers, who tells me that the patient has been experiencing both visual 

and auditory hallucinations for the past couple of days.  The patient states 

that she saw her uncle frying some burgers a couple of days ago, then realized 

that he has been dead for about 2 years.





The patient's friend tells me that the patient's memory is very poor, the 

patient acknowledges that she forgets to take her medications.  Based on a pill 

count, the patient's friend estimates that the patient has not been taking her 

medications for the past week or so.  The patient's friend went on to say that 

the patient is generally unable to take care of herself, in part because of 

chronic right hip pain due to arthritis.





The patient has had similar episodes of hallucinations about 4 times in the past

, when she has forgotten to take her medications.  The most recent episode in 

January, and she was admitted to [facility].  Previously, she was admitted to 

[facility].





The patient's friend tells me that the patient was started on the antipsychotic 

cariprazine (Vraylar) 3 weeks ago.





Here in the ED, the patient's initial BP is found to be mildly elevated at 148/

91, with tachycardia of 112 bpm.  Otherwise, she is afebrile, saturating 91% on 

room air.





Other than the hallucinations, the patient denies recent fever, chills, sore 

throat, ear pain, nasal or sinus congestion, cough, dyspnea, chest pain, 

palpitations, nausea, vomiting, constipation, diarrhea, abdominal pain, urinary 

symptoms, recent weight gain or weight loss, recent bloody bowel movements or 

black bowel movements, recent joint aches, headaches, or rashes.








The patient's PCP is Dr. Kiki Calle.


Her Psychiatrist is Dr. Philippe Abdi.








  ** Right Hip


Pain Score (Numeric/FACES): 8





- Related Data


 Allergies











Allergy/AdvReac Type Severity Reaction Status Date / Time


 


acetaminophen Allergy  Cannot Verified 06/13/20 22:44





[From Tylenol-Codeine #3]   Remember  


 


codeine Allergy  Cannot Verified 06/13/20 22:44





[From Tylenol-Codeine #3]   Remember  


 


morphine AdvReac  Headache Verified 06/13/20 22:44











Home Meds: 


 Home Meds





Cyclobenzaprine [Flexeril] 10 mg PO TID PRN 12/28/16 [History]


Pregabalin [Lyrica] 300 mg PO BID 12/28/16 [History]


Dulaglutide [Trulicity] 1.5 mg SQ TU 10/24/19 [History]


Fluticasone/Vilanterol [Breo Ellipta 100-25 MCG Inhalation Kit] 1 each IH 

ASDIRECTED 01/06/20 [History]


Rosuvastatin Calcium 20 mg PO BEDTIME 01/06/20 [History]


ALPRAZolam [Alprazolam] 0.25 mg PO BID PRN 01/07/20 [History]


FLUoxetine HCl [Fluoxetine HCl] 80 mg PO DAILY 01/07/20 [History]


hydrOXYzine HCL [Hydroxyzine HCl] 25 mg PO TID PRN 01/07/20 [History]


Insulin Glarg,Human.Rec.Analog [Lantus] 30 unit SUBCUT DAILY  ml 01/09/20 [Rx]


Insulin Lispro [HumaLOG] See Protocol SUBCUT QIDACANDBED #1 vial 01/09/20 [Rx]


Nystatin [Nystop] 3 gm TOP TID  bottle 01/09/20 [Rx]


ARIPiprazole [Abilify] 30 mg PO DAILY 06/13/20 [History]


Budesonide/Formoterol Fumarate [Symbicort 160-4.5 Mcg Inhaler] 2 inhalation PO 

BID 06/13/20 [History]


Cariprazine HCl [Vraylar] 1.5 mg PO DAILY 06/13/20 [History]


Dextroamphetamine/Amphetamine [Adderall 10 mg Tablet] 10 mg PO TID 06/13/20 [

History]


Liothyronine [Cytomel] 50 mcg PO DAILY 06/13/20 [History]


Modafinil [Provigil] 100 mg PO DAILY 06/13/20 [History]


Oxybutynin 5 mg PO DAILY 06/13/20 [History]


cloNIDine [cloNIDine HCl] 0.1 mg PO BID 06/13/20 [History]


metFORMIN HCl [Metformin HCl] 1,000 mg PO BID 06/13/20 [History]











Past Medical History


HEENT History: Reports: Impaired Vision (wears glasses)


Cardiovascular History: Reports: High Cholesterol


Respiratory History: Reports: COPD (suspected, not tested)


Genitourinary History: Reports: Urinary Incontinence


Musculoskeletal History: Reports: Osteoarthritis


Psychiatric History: Reports: Depression, PTSD, Schizophrenia, Other (See Below

) (Fibromyalgia)


Endocrine/Metabolic History: Reports: Diabetes, Type II, Hypothyroidism, Obesity

/BMI 30+





- Infectious Disease History


Infectious Disease History: Reports: Chicken Pox, Hepatitis C (untreated), 

Measles, Mumps, Scarlet Fever





- Past Surgical History


HEENT Surgical History: Reports: Oral Surgery (wisdom teeth extraction)


GI Surgical History: Reports: Appendectomy, Cholecystectomy (around 1984)


Musculoskeletal Surgical History: Reports: Other (See Below) (Left ankle 

reconstruction)


Oncologic Surgical History: Reports: Other (See Below) (Cervical biopsy - benign

)





Social & Family History





- Tobacco Use


Smoking Status *Q: Former Smoker


Years of Tobacco use: 45


Packs/Tins Daily: 3


Month/Year Tobacco Last Used: Quit 2018





- Caffeine Use


Caffeine Use: Reports: Coffee, Tea





- Alcohol Use


Alcohol Use History: No





- Recreational Drug Use


Recreational Drug Use: Yes


Drug Use in Last 12 Months: No


Recreational Drug Type: Reports: Marijuana/Hashish (last smoked 2018)





- Living Situation & Occupation


Living situation: Reports: , Alone


Occupation: Disabled





ED ROS GENERAL





- Review of Systems


Review Of Systems: Comprehensive ROS is negative, except as noted in HPI.





ED EXAM, BEHAVIORAL HEALTH





- Physical Exam


Exam: See Below


Exam Limited By: No Limitations


General Appearance: Alert, WD/WN, No Apparent Distress, Other (Somewhat 

disheveled, malodorous)


Eye Exam: Bilateral Eye: EOMI, Normal Inspection


Ears: Normal External Exam, Hearing Grossly Normal


Nose: Normal Inspection


Throat/Mouth: Normal Inspection, Normal Lips, Normal Voice, No Airway Compromise


Head: Atraumatic, Normocephalic


Neck: Normal Inspection, Full Range of Motion


Respiratory/Chest: No Respiratory Distress, Lungs Clear, Normal Breath Sounds, 

No Accessory Muscle Use


Cardiovascular: Normal Peripheral Pulses, Regular Rate, Rhythm, No Gallop, No 

JVD, No Murmur, No Rub


GI/Abdominal: Normal Bowel Sounds, Soft, Non-Tender, No Organomegaly, No 

Distention, No Abnormal Bruit, No Mass


 (Female) Exam: Deferred


Rectal (Female) Exam: Deferred


Back Exam: Normal Inspection, Full Range of Motion, NT


Extremities: Normal Inspection, Normal Range of Motion, No Pedal Edema, Normal 

Capillary Refill


Neurological: Alert, Normal Cognition, No Motor/Sensory Deficits


Psychiatric: Normal Affect, Auditory Hallucinations, Visual Hallucinations


Skin Exam: Warm, Dry, Intact, Normal color, No rash





COURSE, BEHAVIORAL HEALTH COMP





- Course


Vital Signs: 


 Last Vital Signs











Temp  35.8 C L  06/13/20 23:40


 


Pulse  105 H  06/13/20 23:40


 


Resp  18   06/13/20 23:40


 


BP  133/76   06/13/20 23:40


 


Pulse Ox  94 L  06/13/20 23:40











Orders, Labs, Meds: 


 Active Orders 24 hr











 Category Date Time Status


 


 Patient Status [ADT] Routine ADT  06/14/20 00:12 Active


 


 Accu Check [Blood Glucose Check, Bedside] [RC] ONETIME Care  06/13/20 23:18 

Ordered








 Laboratory Tests











  06/13/20 Range/Units





  23:24 


 


POC Glucose  193 H  ()  mg/dL








Medications














Discontinued Medications














Generic Name Dose Route Start Last Admin





  Trade Name Freq  PRN Reason Stop Dose Admin


 


Ketorolac Tromethamine  60 mg  06/13/20 23:24  06/13/20 23:33





  Toradol  IM  06/13/20 23:25  60 mg





  ONETIME ONE   Administration





     





     





     





     











Medical Clearance: 





06/13/20 23:15


As above, the patient has developed both visual and auditory hallucinations, 

almost certainly because she has not been taking her antipsychotic medications 

for at least a week.  The patient would not likely meet criterion for 

psychiatric hospitalization because the only treatment necessary is for the 

patient to take her already prescribed psychiatric medications.  Unfortunately, 

however, the patient has not able to take care of herself, and is forgetful and 

does not take her medications, therefore she would likely benefit from 

placement.





Case discussed with Dr. Bryant at 23:13.  She agreed to place the patient 

into observation.  I will order a  evaluation for placement for 

Monday.








06/13/20 23:24


The patient requested something for her chronic hip pain.  Given that she 

already has hallucinations, I do not feel comfortable in starting her on a 

opioid, however, the patient agreed to a shot of Toradol.





Departure





- Departure


Time of Disposition: 23:17


Disposition: Refer to Observation


Condition: Good


Clinical Impression: 


 Self-care deficit for medication management, Visual hallucinations, Auditory 

hallucinations








- Discharge Information


*PRESCRIPTION DRUG MONITORING PROGRAM REVIEWED*: Not Applicable


*COPY OF PRESCRIPTION DRUG MONITORING REPORT IN PATIENT CHRISTIANE: Not Applicable


Referrals: 


Kiki Calle MD [Primary Care Provider] - 


Forms:  ED Department Discharge





Sepsis Event Note (ED)





- Evaluation


Sepsis Screening Result: No Definite Risk





- Focused Exam


Vital Signs: 


 Vital Signs











  Temp Pulse Resp BP Pulse Ox


 


 06/13/20 23:40  35.8 C L  105 H  18  133/76  94 L


 


 06/13/20 22:37  36.1 C  112 H  18  148/91 H  91 L














- My Orders


Last 24 Hours: 


My Active Orders





06/13/20 23:18


Accu Check [Blood Glucose Check, Bedside] [RC] ONETIME 





06/14/20 00:12


Patient Status [ADT] Routine 














- Assessment/Plan


Last 24 Hours: 


My Active Orders





06/13/20 23:18


Accu Check [Blood Glucose Check, Bedside] [RC] ONETIME 





06/14/20 00:12


Patient Status [ADT] Routine

## 2020-06-14 LAB — HBA1C BLD-MCNC: 8.2 % (ref 4.5–6.2)

## 2020-06-14 RX ADMIN — ALENDRONATE SODIUM SCH: 70 TABLET ORAL at 20:22

## 2020-06-14 RX ADMIN — ALENDRONATE SODIUM SCH: 70 TABLET ORAL at 16:44

## 2020-06-14 RX ADMIN — ALENDRONATE SODIUM SCH INHALATION: 70 TABLET ORAL at 20:39

## 2020-06-14 NOTE — PCM.HP.2
H&P History of Present Illness





- General


Date of Service: 06/15/20


Admit Problem/Dx: 


 Admission Diagnosis/Problem





Admission Diagnosis/Problem      Hallucinations











- History of Present Illness


Initial Comments - Free Text/Narative: 





This is a 64-year-old woman with a past medical history significant for 

schizophrenia and diabetes, who is now brought to the ED by a friend of hers, 

who tells me that the patient has been experiencing both visual and auditory 

hallucinations for the past couple of days.  The patient states that she saw 

her uncle frying some burgers a couple of days ago, then realized that he has 

been dead for about 2 years.





As per friend the patient's memory is very poor, the patient acknowledges that 

she forgets to take her medications.  Based on a pill count, the patient's 

friend estimates that the patient has not been taking her medications for the 

past week or so.  The patient's friend went on to say that the patient is 

generally unable to take care of herself, in part because of chronic right hip 

pain due to arthritis.


The patient has had similar episodes of hallucinations about 4 times in the past

, when she has forgotten to take her medications.  The most recent episode in 

January, and she was admitted to [facility].  Previously, she was admitted to 

[facility].





Of note, she was started on the antipsychotic cariprazine (Vraylar) 3 weeks ago.








  ** Right Hip


Pain Score (Numeric/FACES): 8





- Related Data


Allergies/Adverse Reactions: 


 Allergies











Allergy/AdvReac Type Severity Reaction Status Date / Time


 


acetaminophen Allergy  Cannot Verified 06/14/20 02:56





[From Tylenol-Codeine #3]   Remember  


 


codeine Allergy  Cannot Verified 06/14/20 02:56





[From Tylenol-Codeine #3]   Remember  


 


morphine AdvReac  Headache Verified 06/14/20 02:56











Home Medications: 


 Home Meds





Cyclobenzaprine [Flexeril] 10 mg PO TID PRN 12/28/16 [History]


Pregabalin [Lyrica] 300 mg PO BID 12/28/16 [History]


Dulaglutide [Trulicity] 1.5 mg SQ TU 10/24/19 [History]


Fluticasone/Vilanterol [Breo Ellipta 100-25 MCG Inhalation Kit] 1 each IH 

ASDIRECTED 01/06/20 [History]


Rosuvastatin Calcium 20 mg PO BEDTIME 01/06/20 [History]


ALPRAZolam [Alprazolam] 0.25 mg PO BID PRN 01/07/20 [History]


FLUoxetine HCl [Fluoxetine HCl] 80 mg PO DAILY 01/07/20 [History]


Insulin Glarg,Human.Rec.Analog [Lantus] 30 unit SUBCUT DAILY  ml 01/09/20 [Rx]


Insulin Lispro [HumaLOG] See Protocol SUBCUT QIDACANDBED #1 vial 01/09/20 [Rx]


Nystatin [Nystop] 3 gm TOP TID  bottle 01/09/20 [Rx]


ARIPiprazole [Abilify] 30 mg PO DAILY 06/13/20 [History]


Budesonide/Formoterol Fumarate [Symbicort 160-4.5 Mcg Inhaler] 2 inhalation PO 

BID 06/13/20 [History]


Cariprazine HCl [Vraylar] 1.5 mg PO DAILY 06/13/20 [History]


Dextroamphetamine/Amphetamine [Adderall 10 mg Tablet] 10 mg PO TID 06/13/20 [

History]


Liothyronine [Cytomel] 50 mcg PO DAILY 06/13/20 [History]


Modafinil [Provigil] 100 mg PO DAILY 06/13/20 [History]


Oxybutynin 5 mg PO DAILY 06/13/20 [History]


cloNIDine [cloNIDine HCl] 0.1 mg PO BID 06/13/20 [History]


metFORMIN HCl [Metformin HCl] 1,000 mg PO BID 06/13/20 [History]


Biotin 5,000 mcg PO DAILY 06/14/20 [History]


Ibuprofen [Ibu-200] 1 cap PO Q4HR PRN 06/14/20 [History]


hydrOXYzine pamoate [Vistaril] 25 mg PO TID PRN 06/14/20 [History]











Past Medical History


HEENT History: Reports: Impaired Vision


Other HEENT History: wears eyeglasses, has no teeth, frequent abscesses to "nubs

" of teeth that are still in place.


Cardiovascular History: Reports: High Cholesterol


Respiratory History: Reports: COPD


Genitourinary History: Reports: Urinary Incontinence


OB/GYN History: Reports: Pregnancy


Musculoskeletal History: Reports: Osteoarthritis


Other Musculoskeletal History: chroic right hip pain


Neurological History: Reports: Seizure


Psychiatric History: Reports: Depression, PTSD, Schizophrenia


Endocrine/Metabolic History: Reports: Diabetes, Type II, Hypothyroidism, Obesity

/BMI 30+





- Infectious Disease History


Infectious Disease History: Reports: Chicken Pox, Hepatitis C, Measles, Mumps, 

Scarlet Fever





- Past Surgical History


HEENT Surgical History: Reports: Oral Surgery


Cardiovascular Surgical History: Reports: None


GI Surgical History: Reports: Appendectomy, Cholecystectomy


Female  Surgical History: Reports: None


Endocrine Surgical History: Reports: None


Neurological Surgical History: Reports: None


Musculoskeletal Surgical History: Reports: Other (See Below)


Oncologic Surgical History: Reports: Other (See Below)





Social & Family History





- Family History


Family Medical History: Noncontributory





- Tobacco Use


Smoking Status *Q: Former Smoker


Years of Tobacco use: 25


Packs/Tins Daily: 3


Used Tobacco, but Quit: No


Month/Year Tobacco Last Used: Quit 2018


Second Hand Smoke Exposure: No





- Caffeine Use


Caffeine Use: Reports: Coffee, Tea


Other Caffeine Use: several cups of coffee everyday and an occassional black 

tea too





- Recreational Drug Use


Recreational Drug Use: No


Drug Use in Last 12 Months: No


Recreational Drug Type: Reports: Marijuana/Hashish (last smoked 2018)





- Living Situation & Occupation


Living situation: Reports: , Alone


Occupation: Disabled





H&P Review of Systems





- Review of Systems:


Review Of Systems: Unable To Obtain


Reason Not Obtained: Due to altered mental status





Exam





- Exam


Exam: See Below





- Vital Signs


Vital Signs: 


 Last Vital Signs











Temp  97.5 F   06/14/20 08:01


 


Pulse  97   06/14/20 08:01


 


Resp  20   06/14/20 08:01


 


BP  132/67   06/14/20 08:01


 


Pulse Ox  91 L  06/14/20 08:01











Weight: 114.169 kg





- Exam


General: Alert, Cooperative.  No: Oriented


HEENT: Conjunctiva Clear, EACs Clear, Mucosa Moist & Pink


Neck: Supple


Lungs: Clear to Auscultation, Normal Respiratory Effort.  No: Crackles, Rales, 

Rhonchi, Rub, Stridor, Wheezing


Cardiovascular: Regular Rate, Regular Rhythm.  No: Systolic Murmur, Diastolic 

Murmur, Rubs, Gallop/S3, Gallop/S4


GI/Abdominal Exam: Normal Bowel Sounds, Soft, Non-Tender, Distended.  No: 

Guarding


Extremities: Normal Inspection, Pedal Edema (trace)





- Patient Data


Result Diagrams: 


 06/15/20 04:38





 06/15/20 04:45





Sepsis Event Note





- Evaluation


Sepsis Screening Result: No Definite Risk





- Problem List


(1) Diabetes mellitus


SNOMED Code(s): 15408129


   ICD Code: E11.9 - TYPE 2 DIABETES MELLITUS WITHOUT COMPLICATIONS   Status: 

Acute   Current Visit: Yes   





(2) Acute psychosis


SNOMED Code(s): 75492489, 21193140


   ICD Code: F23 - BRIEF PSYCHOTIC DISORDER   Status: Acute   Current Visit: 

Yes   





(3) Dyslipidemia


SNOMED Code(s): 250537225


   ICD Code: E78.5 - HYPERLIPIDEMIA, UNSPECIFIED   Status: Acute   Current Visit

: Yes   





(4) PTSD (post-traumatic stress disorder)


SNOMED Code(s): 75981363


   ICD Code: F43.10 - POST-TRAUMATIC STRESS DISORDER, UNSPECIFIED   Status: 

Acute   Current Visit: Yes   





(5) Depression


SNOMED Code(s): 00448510


   ICD Code: F32.9 - MAJOR DEPRESSIVE DISORDER, SINGLE EPISODE, UNSPECIFIED   

Status: Acute   Current Visit: Yes   





(6) Hypothyroidism


SNOMED Code(s): 91604866


   ICD Code: E03.9 - HYPOTHYROIDISM, UNSPECIFIED   Status: Acute   Current Visit

: Yes   





(7) Insufficient social support


SNOMED Code(s): 324861212


   ICD Code: Z65.8 - OTH PROBLEMS RELATED TO PSYCHOSOCIAL CIRCUMSTANCES   Status

: Acute   Current Visit: Yes   





(8) Schizophrenia


SNOMED Code(s): 57799837


   ICD Code: F20.9 - SCHIZOPHRENIA, UNSPECIFIED   Status: Acute   Current Visit

: No   


Qualifiers: 


   Schizophrenia type: other   Qualified Code(s): F20.89 - Other schizophrenia; 

F20.8 - Other schizophrenia   


Problem List Initiated/Reviewed/Updated: Yes


Assessment/Plan Comment:: 





ASSESSMENT


- Brought in by friend for acute psychotic episode (visual and auditory 

hallucinations)


   - Likely microvascular disease as well as schizophrenia 


   - 4 prior episodes


   - Recently started on antipsychotic within the past month


- It's unknown when she last took her medications


- She is unable to take care of herself for multiple reasons including 

arthritic pain of hip








Acute psychosis


Schizophrenia


Depression


PTSD (post-traumatic stress disorder)


Insufficient social support


- Psychiatry consult


- PT/OT


- Let me sleep protocol


- PRN Haldol


- Avoid central acting medications as much as possible





Diabetes mellitus, unknown HbA1c


- Scheduled glucose checks


- Restart home long acting insulin


- Hypoglycemia protocol





PROPHYLAXIS


DVT- compression stockings


GI- not indicated





CODE STATUS: FULL CODE





DISPOSITION:


Patient will be admitted to medical floor for PT/OT evaluation as well as 

psychiatry to set up long term placement. 








- Mortality Measure


Prognosis:: Poor

## 2020-06-15 VITALS — SYSTOLIC BLOOD PRESSURE: 130 MMHG | DIASTOLIC BLOOD PRESSURE: 64 MMHG | HEART RATE: 105 BPM

## 2020-06-15 RX ADMIN — ALENDRONATE SODIUM SCH MG: 70 TABLET ORAL at 11:03

## 2020-06-15 RX ADMIN — ALENDRONATE SODIUM SCH INHALATION: 70 TABLET ORAL at 09:20
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
## 2022-02-09 ENCOUNTER — HOSPITAL ENCOUNTER (EMERGENCY)
Dept: HOSPITAL 41 - JD.ED | Age: 67
LOS: 1 days | Discharge: HOME | End: 2022-02-10
Payer: MEDICARE

## 2022-02-09 VITALS — HEART RATE: 98 BPM | SYSTOLIC BLOOD PRESSURE: 129 MMHG | DIASTOLIC BLOOD PRESSURE: 69 MMHG

## 2022-02-09 DIAGNOSIS — Z79.4: ICD-10-CM

## 2022-02-09 DIAGNOSIS — J44.9: ICD-10-CM

## 2022-02-09 DIAGNOSIS — E03.9: ICD-10-CM

## 2022-02-09 DIAGNOSIS — E66.9: ICD-10-CM

## 2022-02-09 DIAGNOSIS — Z88.5: ICD-10-CM

## 2022-02-09 DIAGNOSIS — Z72.0: ICD-10-CM

## 2022-02-09 DIAGNOSIS — E78.00: ICD-10-CM

## 2022-02-09 DIAGNOSIS — Z88.8: ICD-10-CM

## 2022-02-09 DIAGNOSIS — E11.9: ICD-10-CM

## 2022-02-09 DIAGNOSIS — M16.11: Primary | ICD-10-CM

## 2022-02-09 DIAGNOSIS — Z79.899: ICD-10-CM

## 2022-02-27 ENCOUNTER — HOSPITAL ENCOUNTER (EMERGENCY)
Dept: HOSPITAL 41 - JD.ED | Age: 67
Discharge: HOME | End: 2022-02-27
Payer: MEDICARE

## 2022-02-27 VITALS — DIASTOLIC BLOOD PRESSURE: 73 MMHG | HEART RATE: 103 BPM | SYSTOLIC BLOOD PRESSURE: 144 MMHG

## 2022-02-27 DIAGNOSIS — E66.9: ICD-10-CM

## 2022-02-27 DIAGNOSIS — Z88.8: ICD-10-CM

## 2022-02-27 DIAGNOSIS — E03.9: ICD-10-CM

## 2022-02-27 DIAGNOSIS — N39.0: Primary | ICD-10-CM

## 2022-02-27 DIAGNOSIS — E11.9: ICD-10-CM

## 2022-02-27 DIAGNOSIS — E78.00: ICD-10-CM

## 2022-02-27 DIAGNOSIS — Z79.4: ICD-10-CM

## 2022-02-27 DIAGNOSIS — J44.9: ICD-10-CM

## 2022-02-27 DIAGNOSIS — M19.90: ICD-10-CM

## 2022-02-27 DIAGNOSIS — Z88.5: ICD-10-CM

## 2022-02-27 DIAGNOSIS — M25.551: ICD-10-CM

## 2022-05-12 ENCOUNTER — HOSPITAL ENCOUNTER (INPATIENT)
Dept: HOSPITAL 41 - JD.ED | Age: 67
LOS: 22 days | Discharge: HOME HEALTH | DRG: 690 | End: 2022-06-03
Attending: STUDENT IN AN ORGANIZED HEALTH CARE EDUCATION/TRAINING PROGRAM | Admitting: HOSPITALIST
Payer: MEDICARE

## 2022-05-12 DIAGNOSIS — Z88.8: ICD-10-CM

## 2022-05-12 DIAGNOSIS — R62.7: ICD-10-CM

## 2022-05-12 DIAGNOSIS — J44.9: ICD-10-CM

## 2022-05-12 DIAGNOSIS — M19.90: ICD-10-CM

## 2022-05-12 DIAGNOSIS — F11.90: ICD-10-CM

## 2022-05-12 DIAGNOSIS — Z88.5: ICD-10-CM

## 2022-05-12 DIAGNOSIS — Z65.8: ICD-10-CM

## 2022-05-12 DIAGNOSIS — R44.3: ICD-10-CM

## 2022-05-12 DIAGNOSIS — E11.9: ICD-10-CM

## 2022-05-12 DIAGNOSIS — G40.909: ICD-10-CM

## 2022-05-12 DIAGNOSIS — E03.9: ICD-10-CM

## 2022-05-12 DIAGNOSIS — M25.551: ICD-10-CM

## 2022-05-12 DIAGNOSIS — F17.200: ICD-10-CM

## 2022-05-12 DIAGNOSIS — F42.9: ICD-10-CM

## 2022-05-12 DIAGNOSIS — R41.0: ICD-10-CM

## 2022-05-12 DIAGNOSIS — Z90.49: ICD-10-CM

## 2022-05-12 DIAGNOSIS — Z91.81: ICD-10-CM

## 2022-05-12 DIAGNOSIS — E78.5: ICD-10-CM

## 2022-05-12 DIAGNOSIS — R53.1: ICD-10-CM

## 2022-05-12 DIAGNOSIS — G89.29: ICD-10-CM

## 2022-05-12 DIAGNOSIS — F12.90: ICD-10-CM

## 2022-05-12 DIAGNOSIS — R32: ICD-10-CM

## 2022-05-12 DIAGNOSIS — F43.10: ICD-10-CM

## 2022-05-12 DIAGNOSIS — F32.A: ICD-10-CM

## 2022-05-12 DIAGNOSIS — E78.00: ICD-10-CM

## 2022-05-12 DIAGNOSIS — Z79.899: ICD-10-CM

## 2022-05-12 DIAGNOSIS — E86.0: ICD-10-CM

## 2022-05-12 DIAGNOSIS — F32.89: ICD-10-CM

## 2022-05-12 DIAGNOSIS — N39.0: Primary | ICD-10-CM

## 2022-05-12 DIAGNOSIS — F20.89: ICD-10-CM

## 2022-05-12 DIAGNOSIS — E83.42: ICD-10-CM

## 2022-05-12 DIAGNOSIS — Z79.4: ICD-10-CM

## 2022-05-12 DIAGNOSIS — D69.6: ICD-10-CM

## 2022-05-12 DIAGNOSIS — E11.69: ICD-10-CM

## 2022-05-12 DIAGNOSIS — H54.7: ICD-10-CM

## 2022-05-12 DIAGNOSIS — F17.210: ICD-10-CM

## 2022-05-12 DIAGNOSIS — E87.6: ICD-10-CM

## 2022-05-12 DIAGNOSIS — Z20.822: ICD-10-CM

## 2022-05-12 DIAGNOSIS — H60.503: ICD-10-CM
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
## 2022-05-12 LAB
APAP SERPL-MCNC: 0 UG/ML (ref 10–30)
EGFRCR SERPLBLD CKD-EPI 2021: > 60 ML/MIN (ref 60–?)
HBA1C BLD-MCNC: 7.3 %

## 2022-05-12 PROCEDURE — 96368 THER/DIAG CONCURRENT INF: CPT

## 2022-05-12 PROCEDURE — U0002 COVID-19 LAB TEST NON-CDC: HCPCS

## 2022-05-12 PROCEDURE — 80143 DRUG ASSAY ACETAMINOPHEN: CPT

## 2022-05-12 PROCEDURE — 80307 DRUG TEST PRSMV CHEM ANLYZR: CPT

## 2022-05-12 PROCEDURE — 71045 X-RAY EXAM CHEST 1 VIEW: CPT

## 2022-05-12 PROCEDURE — 80179 DRUG ASSAY SALICYLATE: CPT

## 2022-05-12 PROCEDURE — 87088 URINE BACTERIA CULTURE: CPT

## 2022-05-12 PROCEDURE — 83036 HEMOGLOBIN GLYCOSYLATED A1C: CPT

## 2022-05-12 PROCEDURE — 99285 EMERGENCY DEPT VISIT HI MDM: CPT

## 2022-05-12 PROCEDURE — 85025 COMPLETE CBC W/AUTO DIFF WBC: CPT

## 2022-05-12 PROCEDURE — 87086 URINE CULTURE/COLONY COUNT: CPT

## 2022-05-12 PROCEDURE — 70450 CT HEAD/BRAIN W/O DYE: CPT

## 2022-05-12 PROCEDURE — 80306 DRUG TEST PRSMV INSTRMNT: CPT

## 2022-05-12 PROCEDURE — 84443 ASSAY THYROID STIM HORMONE: CPT

## 2022-05-12 PROCEDURE — 83735 ASSAY OF MAGNESIUM: CPT

## 2022-05-12 PROCEDURE — 86140 C-REACTIVE PROTEIN: CPT

## 2022-05-12 PROCEDURE — 96375 TX/PRO/DX INJ NEW DRUG ADDON: CPT

## 2022-05-12 PROCEDURE — 81001 URINALYSIS AUTO W/SCOPE: CPT

## 2022-05-12 PROCEDURE — 93005 ELECTROCARDIOGRAM TRACING: CPT

## 2022-05-12 PROCEDURE — 80053 COMPREHEN METABOLIC PANEL: CPT

## 2022-05-12 PROCEDURE — 36415 COLL VENOUS BLD VENIPUNCTURE: CPT

## 2022-05-12 PROCEDURE — 87186 SC STD MICRODIL/AGAR DIL: CPT

## 2022-05-12 PROCEDURE — 96365 THER/PROPH/DIAG IV INF INIT: CPT

## 2022-05-12 RX ADMIN — HEPARIN SODIUM SCH UNITS: 5000 INJECTION, SOLUTION INTRAVENOUS; SUBCUTANEOUS at 18:31

## 2022-05-12 RX ADMIN — INSULIN LISPRO SCH: 100 INJECTION, SOLUTION INTRAVENOUS; SUBCUTANEOUS at 20:39

## 2022-05-12 RX ADMIN — Medication PRN ML: at 12:11

## 2022-05-12 RX ADMIN — INSULIN LISPRO SCH UNITS: 100 INJECTION, SOLUTION INTRAVENOUS; SUBCUTANEOUS at 18:34

## 2022-05-13 LAB — EGFRCR SERPLBLD CKD-EPI 2021: > 60 ML/MIN (ref 60–?)

## 2022-05-13 RX ADMIN — INSULIN LISPRO SCH UNITS: 100 INJECTION, SOLUTION INTRAVENOUS; SUBCUTANEOUS at 11:39

## 2022-05-13 RX ADMIN — HEPARIN SODIUM SCH UNITS: 5000 INJECTION, SOLUTION INTRAVENOUS; SUBCUTANEOUS at 17:06

## 2022-05-13 RX ADMIN — INSULIN LISPRO SCH UNITS: 100 INJECTION, SOLUTION INTRAVENOUS; SUBCUTANEOUS at 17:06

## 2022-05-13 RX ADMIN — POTASSIUM CHLORIDE SCH MEQ: 1500 TABLET, EXTENDED RELEASE ORAL at 09:27

## 2022-05-13 RX ADMIN — OXYBUTYNIN CHLORIDE SCH MG: 5 TABLET, FILM COATED, EXTENDED RELEASE ORAL at 08:00

## 2022-05-13 RX ADMIN — POTASSIUM CHLORIDE SCH: 1500 TABLET, EXTENDED RELEASE ORAL at 09:29

## 2022-05-13 RX ADMIN — INSULIN LISPRO SCH UNITS: 100 INJECTION, SOLUTION INTRAVENOUS; SUBCUTANEOUS at 07:46

## 2022-05-13 RX ADMIN — BUPROPION HYDROCHLORIDE SCH MG: 150 TABLET, EXTENDED RELEASE ORAL at 08:00

## 2022-05-13 RX ADMIN — HEPARIN SODIUM SCH UNITS: 5000 INJECTION, SOLUTION INTRAVENOUS; SUBCUTANEOUS at 08:00

## 2022-05-13 RX ADMIN — HEPARIN SODIUM SCH UNITS: 5000 INJECTION, SOLUTION INTRAVENOUS; SUBCUTANEOUS at 00:14

## 2022-05-13 RX ADMIN — INSULIN LISPRO SCH UNITS: 100 INJECTION, SOLUTION INTRAVENOUS; SUBCUTANEOUS at 20:47

## 2022-05-14 LAB — EGFRCR SERPLBLD CKD-EPI 2021: > 60 ML/MIN (ref 60–?)

## 2022-05-14 RX ADMIN — INSULIN LISPRO SCH: 100 INJECTION, SOLUTION INTRAVENOUS; SUBCUTANEOUS at 11:20

## 2022-05-14 RX ADMIN — BUPROPION HYDROCHLORIDE SCH MG: 150 TABLET, EXTENDED RELEASE ORAL at 07:40

## 2022-05-14 RX ADMIN — POTASSIUM CHLORIDE SCH MEQ: 1500 TABLET, EXTENDED RELEASE ORAL at 08:04

## 2022-05-14 RX ADMIN — INSULIN LISPRO SCH UNITS: 100 INJECTION, SOLUTION INTRAVENOUS; SUBCUTANEOUS at 20:32

## 2022-05-14 RX ADMIN — HEPARIN SODIUM SCH UNITS: 5000 INJECTION, SOLUTION INTRAVENOUS; SUBCUTANEOUS at 00:18

## 2022-05-14 RX ADMIN — HEPARIN SODIUM SCH UNITS: 5000 INJECTION, SOLUTION INTRAVENOUS; SUBCUTANEOUS at 07:40

## 2022-05-14 RX ADMIN — INSULIN LISPRO SCH UNITS: 100 INJECTION, SOLUTION INTRAVENOUS; SUBCUTANEOUS at 07:40

## 2022-05-14 RX ADMIN — INSULIN GLARGINE SCH UNITS: 100 INJECTION, SOLUTION SUBCUTANEOUS at 20:37

## 2022-05-14 RX ADMIN — INSULIN LISPRO SCH UNITS: 100 INJECTION, SOLUTION INTRAVENOUS; SUBCUTANEOUS at 18:13

## 2022-05-14 RX ADMIN — OXYBUTYNIN CHLORIDE SCH MG: 5 TABLET, FILM COATED, EXTENDED RELEASE ORAL at 08:03

## 2022-05-14 RX ADMIN — HEPARIN SODIUM SCH UNITS: 5000 INJECTION, SOLUTION INTRAVENOUS; SUBCUTANEOUS at 15:01

## 2022-05-15 LAB — EGFRCR SERPLBLD CKD-EPI 2021: > 60 ML/MIN (ref 60–?)

## 2022-05-15 RX ADMIN — HEPARIN SODIUM SCH UNITS: 5000 INJECTION, SOLUTION INTRAVENOUS; SUBCUTANEOUS at 08:11

## 2022-05-15 RX ADMIN — INSULIN LISPRO SCH UNITS: 100 INJECTION, SOLUTION INTRAVENOUS; SUBCUTANEOUS at 20:23

## 2022-05-15 RX ADMIN — HEPARIN SODIUM SCH UNITS: 5000 INJECTION, SOLUTION INTRAVENOUS; SUBCUTANEOUS at 00:45

## 2022-05-15 RX ADMIN — HEPARIN SODIUM SCH UNITS: 5000 INJECTION, SOLUTION INTRAVENOUS; SUBCUTANEOUS at 23:48

## 2022-05-15 RX ADMIN — POTASSIUM CHLORIDE SCH MEQ: 1500 TABLET, EXTENDED RELEASE ORAL at 08:11

## 2022-05-15 RX ADMIN — HEPARIN SODIUM SCH UNITS: 5000 INJECTION, SOLUTION INTRAVENOUS; SUBCUTANEOUS at 16:49

## 2022-05-15 RX ADMIN — INSULIN GLARGINE SCH UNITS: 100 INJECTION, SOLUTION SUBCUTANEOUS at 20:25

## 2022-05-15 RX ADMIN — OXYBUTYNIN CHLORIDE SCH MG: 5 TABLET, FILM COATED, EXTENDED RELEASE ORAL at 08:11

## 2022-05-15 RX ADMIN — INSULIN LISPRO SCH UNITS: 100 INJECTION, SOLUTION INTRAVENOUS; SUBCUTANEOUS at 08:11

## 2022-05-15 RX ADMIN — Medication PRN ML: at 20:29

## 2022-05-15 RX ADMIN — INSULIN LISPRO SCH UNITS: 100 INJECTION, SOLUTION INTRAVENOUS; SUBCUTANEOUS at 16:50

## 2022-05-15 RX ADMIN — BUPROPION HYDROCHLORIDE SCH MG: 150 TABLET, EXTENDED RELEASE ORAL at 08:11

## 2022-05-15 RX ADMIN — INSULIN LISPRO SCH UNITS: 100 INJECTION, SOLUTION INTRAVENOUS; SUBCUTANEOUS at 11:15

## 2022-05-16 LAB — EGFRCR SERPLBLD CKD-EPI 2021: > 60 ML/MIN (ref 60–?)

## 2022-05-16 RX ADMIN — HEPARIN SODIUM SCH UNITS: 5000 INJECTION, SOLUTION INTRAVENOUS; SUBCUTANEOUS at 23:47

## 2022-05-16 RX ADMIN — INSULIN LISPRO SCH UNITS: 100 INJECTION, SOLUTION INTRAVENOUS; SUBCUTANEOUS at 16:48

## 2022-05-16 RX ADMIN — INSULIN LISPRO SCH UNITS: 100 INJECTION, SOLUTION INTRAVENOUS; SUBCUTANEOUS at 11:58

## 2022-05-16 RX ADMIN — INSULIN GLARGINE SCH UNITS: 100 INJECTION, SOLUTION SUBCUTANEOUS at 21:15

## 2022-05-16 RX ADMIN — HEPARIN SODIUM SCH UNITS: 5000 INJECTION, SOLUTION INTRAVENOUS; SUBCUTANEOUS at 08:44

## 2022-05-16 RX ADMIN — INSULIN LISPRO SCH UNITS: 100 INJECTION, SOLUTION INTRAVENOUS; SUBCUTANEOUS at 21:16

## 2022-05-16 RX ADMIN — HEPARIN SODIUM SCH UNITS: 5000 INJECTION, SOLUTION INTRAVENOUS; SUBCUTANEOUS at 16:02

## 2022-05-16 RX ADMIN — INSULIN LISPRO SCH: 100 INJECTION, SOLUTION INTRAVENOUS; SUBCUTANEOUS at 06:30

## 2022-05-16 RX ADMIN — OXYBUTYNIN CHLORIDE SCH MG: 5 TABLET, FILM COATED, EXTENDED RELEASE ORAL at 08:43

## 2022-05-16 RX ADMIN — POTASSIUM CHLORIDE SCH MEQ: 1500 TABLET, EXTENDED RELEASE ORAL at 08:43

## 2022-05-16 RX ADMIN — BUPROPION HYDROCHLORIDE SCH MG: 150 TABLET, EXTENDED RELEASE ORAL at 08:43

## 2022-05-17 LAB — EGFRCR SERPLBLD CKD-EPI 2021: > 60 ML/MIN (ref 60–?)

## 2022-05-17 RX ADMIN — INSULIN GLARGINE SCH UNIT: 100 INJECTION, SOLUTION SUBCUTANEOUS at 20:56

## 2022-05-17 RX ADMIN — INSULIN LISPRO SCH UNITS: 100 INJECTION, SOLUTION INTRAVENOUS; SUBCUTANEOUS at 20:53

## 2022-05-17 RX ADMIN — INSULIN LISPRO SCH UNITS: 100 INJECTION, SOLUTION INTRAVENOUS; SUBCUTANEOUS at 07:35

## 2022-05-17 RX ADMIN — INSULIN LISPRO SCH UNITS: 100 INJECTION, SOLUTION INTRAVENOUS; SUBCUTANEOUS at 07:36

## 2022-05-17 RX ADMIN — BUPROPION HYDROCHLORIDE SCH MG: 150 TABLET, EXTENDED RELEASE ORAL at 08:24

## 2022-05-17 RX ADMIN — INSULIN LISPRO SCH UNITS: 100 INJECTION, SOLUTION INTRAVENOUS; SUBCUTANEOUS at 11:25

## 2022-05-17 RX ADMIN — INSULIN LISPRO SCH UNITS: 100 INJECTION, SOLUTION INTRAVENOUS; SUBCUTANEOUS at 17:21

## 2022-05-17 RX ADMIN — HEPARIN SODIUM SCH UNITS: 5000 INJECTION, SOLUTION INTRAVENOUS; SUBCUTANEOUS at 08:25

## 2022-05-17 RX ADMIN — OXYBUTYNIN CHLORIDE SCH MG: 5 TABLET, FILM COATED, EXTENDED RELEASE ORAL at 08:24

## 2022-05-17 RX ADMIN — HEPARIN SODIUM SCH UNITS: 5000 INJECTION, SOLUTION INTRAVENOUS; SUBCUTANEOUS at 17:18

## 2022-05-17 RX ADMIN — POTASSIUM CHLORIDE SCH MEQ: 1500 TABLET, EXTENDED RELEASE ORAL at 08:24

## 2022-05-18 RX ADMIN — Medication PRN ML: at 16:09

## 2022-05-18 RX ADMIN — Medication SCH MG: at 10:06

## 2022-05-18 RX ADMIN — Medication SCH MG: at 15:24

## 2022-05-18 RX ADMIN — POTASSIUM CHLORIDE SCH MEQ: 1500 TABLET, EXTENDED RELEASE ORAL at 08:27

## 2022-05-18 RX ADMIN — HEPARIN SODIUM SCH UNITS: 5000 INJECTION, SOLUTION INTRAVENOUS; SUBCUTANEOUS at 00:12

## 2022-05-18 RX ADMIN — BUPROPION HYDROCHLORIDE SCH MG: 150 TABLET, EXTENDED RELEASE ORAL at 08:27

## 2022-05-18 RX ADMIN — INSULIN LISPRO SCH UNITS: 100 INJECTION, SOLUTION INTRAVENOUS; SUBCUTANEOUS at 21:43

## 2022-05-18 RX ADMIN — OXYBUTYNIN CHLORIDE SCH MG: 5 TABLET, FILM COATED, EXTENDED RELEASE ORAL at 08:26

## 2022-05-18 RX ADMIN — INSULIN LISPRO SCH UNITS: 100 INJECTION, SOLUTION INTRAVENOUS; SUBCUTANEOUS at 18:07

## 2022-05-18 RX ADMIN — INSULIN GLARGINE SCH UNIT: 100 INJECTION, SOLUTION SUBCUTANEOUS at 21:36

## 2022-05-18 RX ADMIN — HEPARIN SODIUM SCH UNITS: 5000 INJECTION, SOLUTION INTRAVENOUS; SUBCUTANEOUS at 08:26

## 2022-05-18 RX ADMIN — HEPARIN SODIUM SCH UNITS: 5000 INJECTION, SOLUTION INTRAVENOUS; SUBCUTANEOUS at 16:08

## 2022-05-18 RX ADMIN — INSULIN LISPRO SCH UNITS: 100 INJECTION, SOLUTION INTRAVENOUS; SUBCUTANEOUS at 08:29

## 2022-05-18 RX ADMIN — Medication SCH MG: at 21:39

## 2022-05-18 RX ADMIN — INSULIN LISPRO SCH UNITS: 100 INJECTION, SOLUTION INTRAVENOUS; SUBCUTANEOUS at 12:51

## 2022-05-19 RX ADMIN — OXYBUTYNIN CHLORIDE SCH MG: 5 TABLET, FILM COATED, EXTENDED RELEASE ORAL at 08:00

## 2022-05-19 RX ADMIN — INSULIN LISPRO SCH UNITS: 100 INJECTION, SOLUTION INTRAVENOUS; SUBCUTANEOUS at 18:17

## 2022-05-19 RX ADMIN — INSULIN GLARGINE SCH UNIT: 100 INJECTION, SOLUTION SUBCUTANEOUS at 21:04

## 2022-05-19 RX ADMIN — HEPARIN SODIUM SCH UNITS: 5000 INJECTION, SOLUTION INTRAVENOUS; SUBCUTANEOUS at 00:10

## 2022-05-19 RX ADMIN — INSULIN LISPRO SCH: 100 INJECTION, SOLUTION INTRAVENOUS; SUBCUTANEOUS at 07:45

## 2022-05-19 RX ADMIN — Medication SCH MG: at 15:24

## 2022-05-19 RX ADMIN — Medication SCH MG: at 08:00

## 2022-05-19 RX ADMIN — INSULIN LISPRO SCH UNITS: 100 INJECTION, SOLUTION INTRAVENOUS; SUBCUTANEOUS at 13:07

## 2022-05-19 RX ADMIN — Medication SCH MG: at 21:02

## 2022-05-19 RX ADMIN — POTASSIUM CHLORIDE SCH MEQ: 1500 TABLET, EXTENDED RELEASE ORAL at 08:01

## 2022-05-19 RX ADMIN — INSULIN LISPRO SCH UNITS: 100 INJECTION, SOLUTION INTRAVENOUS; SUBCUTANEOUS at 21:03

## 2022-05-19 RX ADMIN — HEPARIN SODIUM SCH UNITS: 5000 INJECTION, SOLUTION INTRAVENOUS; SUBCUTANEOUS at 07:53

## 2022-05-19 RX ADMIN — HEPARIN SODIUM SCH UNITS: 5000 INJECTION, SOLUTION INTRAVENOUS; SUBCUTANEOUS at 15:24

## 2022-05-19 RX ADMIN — BUPROPION HYDROCHLORIDE SCH MG: 150 TABLET, EXTENDED RELEASE ORAL at 08:01

## 2022-05-20 RX ADMIN — INSULIN GLARGINE SCH UNITS: 100 INJECTION, SOLUTION SUBCUTANEOUS at 21:14

## 2022-05-20 RX ADMIN — BUPROPION HYDROCHLORIDE SCH MG: 150 TABLET, EXTENDED RELEASE ORAL at 09:14

## 2022-05-20 RX ADMIN — INSULIN LISPRO SCH UNITS: 100 INJECTION, SOLUTION INTRAVENOUS; SUBCUTANEOUS at 12:50

## 2022-05-20 RX ADMIN — POTASSIUM CHLORIDE SCH MEQ: 1500 TABLET, EXTENDED RELEASE ORAL at 09:13

## 2022-05-20 RX ADMIN — Medication SCH MG: at 09:13

## 2022-05-20 RX ADMIN — INSULIN LISPRO SCH: 100 INJECTION, SOLUTION INTRAVENOUS; SUBCUTANEOUS at 06:50

## 2022-05-20 RX ADMIN — HEPARIN SODIUM SCH UNITS: 5000 INJECTION, SOLUTION INTRAVENOUS; SUBCUTANEOUS at 16:45

## 2022-05-20 RX ADMIN — HEPARIN SODIUM SCH UNITS: 5000 INJECTION, SOLUTION INTRAVENOUS; SUBCUTANEOUS at 00:02

## 2022-05-20 RX ADMIN — INSULIN LISPRO SCH UNITS: 100 INJECTION, SOLUTION INTRAVENOUS; SUBCUTANEOUS at 16:56

## 2022-05-20 RX ADMIN — HEPARIN SODIUM SCH UNITS: 5000 INJECTION, SOLUTION INTRAVENOUS; SUBCUTANEOUS at 09:15

## 2022-05-20 RX ADMIN — INSULIN LISPRO SCH UNITS: 100 INJECTION, SOLUTION INTRAVENOUS; SUBCUTANEOUS at 21:14

## 2022-05-20 RX ADMIN — OXYBUTYNIN CHLORIDE SCH MG: 5 TABLET, FILM COATED, EXTENDED RELEASE ORAL at 09:15

## 2022-05-20 RX ADMIN — Medication SCH MG: at 20:36

## 2022-05-20 RX ADMIN — Medication SCH MG: at 16:45

## 2022-05-21 LAB — EGFRCR SERPLBLD CKD-EPI 2021: > 60 ML/MIN (ref 60–?)

## 2022-05-21 RX ADMIN — INSULIN LISPRO SCH UNITS: 100 INJECTION, SOLUTION INTRAVENOUS; SUBCUTANEOUS at 17:46

## 2022-05-21 RX ADMIN — BUPROPION HYDROCHLORIDE SCH MG: 150 TABLET, EXTENDED RELEASE ORAL at 08:42

## 2022-05-21 RX ADMIN — Medication SCH MG: at 15:02

## 2022-05-21 RX ADMIN — HEPARIN SODIUM SCH UNITS: 5000 INJECTION, SOLUTION INTRAVENOUS; SUBCUTANEOUS at 08:42

## 2022-05-21 RX ADMIN — OXYBUTYNIN CHLORIDE SCH MG: 5 TABLET, FILM COATED, EXTENDED RELEASE ORAL at 08:42

## 2022-05-21 RX ADMIN — INSULIN LISPRO SCH UNITS: 100 INJECTION, SOLUTION INTRAVENOUS; SUBCUTANEOUS at 20:44

## 2022-05-21 RX ADMIN — POTASSIUM CHLORIDE SCH MEQ: 1500 TABLET, EXTENDED RELEASE ORAL at 08:42

## 2022-05-21 RX ADMIN — INSULIN LISPRO SCH: 100 INJECTION, SOLUTION INTRAVENOUS; SUBCUTANEOUS at 07:08

## 2022-05-21 RX ADMIN — Medication SCH MG: at 08:42

## 2022-05-21 RX ADMIN — INSULIN GLARGINE SCH UNITS: 100 INJECTION, SOLUTION SUBCUTANEOUS at 20:45

## 2022-05-21 RX ADMIN — HEPARIN SODIUM SCH UNITS: 5000 INJECTION, SOLUTION INTRAVENOUS; SUBCUTANEOUS at 01:18

## 2022-05-21 RX ADMIN — HEPARIN SODIUM SCH UNITS: 5000 INJECTION, SOLUTION INTRAVENOUS; SUBCUTANEOUS at 15:03

## 2022-05-21 RX ADMIN — INSULIN LISPRO SCH UNITS: 100 INJECTION, SOLUTION INTRAVENOUS; SUBCUTANEOUS at 11:55

## 2022-05-21 RX ADMIN — Medication SCH MG: at 20:48

## 2022-05-22 LAB — EGFRCR SERPLBLD CKD-EPI 2021: > 60 ML/MIN (ref 60–?)

## 2022-05-22 RX ADMIN — OXYBUTYNIN CHLORIDE SCH MG: 5 TABLET, FILM COATED, EXTENDED RELEASE ORAL at 08:33

## 2022-05-22 RX ADMIN — POTASSIUM CHLORIDE SCH MEQ: 1500 TABLET, EXTENDED RELEASE ORAL at 08:32

## 2022-05-22 RX ADMIN — INSULIN GLARGINE SCH UNITS: 100 INJECTION, SOLUTION SUBCUTANEOUS at 21:38

## 2022-05-22 RX ADMIN — Medication SCH MG: at 21:35

## 2022-05-22 RX ADMIN — HEPARIN SODIUM SCH UNITS: 5000 INJECTION, SOLUTION INTRAVENOUS; SUBCUTANEOUS at 08:33

## 2022-05-22 RX ADMIN — INSULIN LISPRO SCH UNITS: 100 INJECTION, SOLUTION INTRAVENOUS; SUBCUTANEOUS at 17:25

## 2022-05-22 RX ADMIN — HEPARIN SODIUM SCH UNITS: 5000 INJECTION, SOLUTION INTRAVENOUS; SUBCUTANEOUS at 16:02

## 2022-05-22 RX ADMIN — INSULIN LISPRO SCH UNITS: 100 INJECTION, SOLUTION INTRAVENOUS; SUBCUTANEOUS at 21:36

## 2022-05-22 RX ADMIN — INSULIN LISPRO SCH UNITS: 100 INJECTION, SOLUTION INTRAVENOUS; SUBCUTANEOUS at 08:31

## 2022-05-22 RX ADMIN — Medication SCH MG: at 08:33

## 2022-05-22 RX ADMIN — INSULIN LISPRO SCH UNITS: 100 INJECTION, SOLUTION INTRAVENOUS; SUBCUTANEOUS at 12:18

## 2022-05-22 RX ADMIN — BUPROPION HYDROCHLORIDE SCH MG: 150 TABLET, EXTENDED RELEASE ORAL at 08:33

## 2022-05-22 RX ADMIN — Medication SCH MG: at 16:03

## 2022-05-22 RX ADMIN — HEPARIN SODIUM SCH UNITS: 5000 INJECTION, SOLUTION INTRAVENOUS; SUBCUTANEOUS at 00:45

## 2022-05-23 LAB — EGFRCR SERPLBLD CKD-EPI 2021: > 60 ML/MIN (ref 60–?)

## 2022-05-23 RX ADMIN — HEPARIN SODIUM SCH UNITS: 5000 INJECTION, SOLUTION INTRAVENOUS; SUBCUTANEOUS at 09:23

## 2022-05-23 RX ADMIN — INSULIN GLARGINE SCH UNITS: 100 INJECTION, SOLUTION SUBCUTANEOUS at 19:59

## 2022-05-23 RX ADMIN — POTASSIUM CHLORIDE SCH MEQ: 1500 TABLET, EXTENDED RELEASE ORAL at 09:23

## 2022-05-23 RX ADMIN — INSULIN LISPRO SCH UNITS: 100 INJECTION, SOLUTION INTRAVENOUS; SUBCUTANEOUS at 17:30

## 2022-05-23 RX ADMIN — HEPARIN SODIUM SCH UNITS: 5000 INJECTION, SOLUTION INTRAVENOUS; SUBCUTANEOUS at 16:30

## 2022-05-23 RX ADMIN — HEPARIN SODIUM SCH UNITS: 5000 INJECTION, SOLUTION INTRAVENOUS; SUBCUTANEOUS at 00:54

## 2022-05-23 RX ADMIN — OXYBUTYNIN CHLORIDE SCH MG: 5 TABLET, FILM COATED, EXTENDED RELEASE ORAL at 09:23

## 2022-05-23 RX ADMIN — BUPROPION HYDROCHLORIDE SCH MG: 150 TABLET, EXTENDED RELEASE ORAL at 09:23

## 2022-05-23 RX ADMIN — INSULIN LISPRO SCH: 100 INJECTION, SOLUTION INTRAVENOUS; SUBCUTANEOUS at 06:51

## 2022-05-23 RX ADMIN — Medication SCH MG: at 09:23

## 2022-05-23 RX ADMIN — INSULIN LISPRO SCH UNITS: 100 INJECTION, SOLUTION INTRAVENOUS; SUBCUTANEOUS at 12:25

## 2022-05-23 RX ADMIN — INSULIN LISPRO SCH: 100 INJECTION, SOLUTION INTRAVENOUS; SUBCUTANEOUS at 21:26

## 2022-05-24 RX ADMIN — INSULIN LISPRO SCH: 100 INJECTION, SOLUTION INTRAVENOUS; SUBCUTANEOUS at 07:54

## 2022-05-24 RX ADMIN — INSULIN LISPRO SCH UNITS: 100 INJECTION, SOLUTION INTRAVENOUS; SUBCUTANEOUS at 21:22

## 2022-05-24 RX ADMIN — HEPARIN SODIUM SCH UNITS: 5000 INJECTION, SOLUTION INTRAVENOUS; SUBCUTANEOUS at 00:35

## 2022-05-24 RX ADMIN — INSULIN LISPRO SCH UNITS: 100 INJECTION, SOLUTION INTRAVENOUS; SUBCUTANEOUS at 17:38

## 2022-05-24 RX ADMIN — INSULIN GLARGINE SCH UNITS: 100 INJECTION, SOLUTION SUBCUTANEOUS at 21:18

## 2022-05-24 RX ADMIN — HEPARIN SODIUM SCH UNITS: 5000 INJECTION, SOLUTION INTRAVENOUS; SUBCUTANEOUS at 08:57

## 2022-05-24 RX ADMIN — OXYBUTYNIN CHLORIDE SCH MG: 5 TABLET, FILM COATED, EXTENDED RELEASE ORAL at 08:53

## 2022-05-24 RX ADMIN — HEPARIN SODIUM SCH UNITS: 5000 INJECTION, SOLUTION INTRAVENOUS; SUBCUTANEOUS at 15:42

## 2022-05-24 RX ADMIN — BUPROPION HYDROCHLORIDE SCH MG: 150 TABLET, EXTENDED RELEASE ORAL at 08:54

## 2022-05-24 RX ADMIN — INSULIN LISPRO SCH UNITS: 100 INJECTION, SOLUTION INTRAVENOUS; SUBCUTANEOUS at 12:59

## 2022-05-24 RX ADMIN — POTASSIUM CHLORIDE SCH MEQ: 1500 TABLET, EXTENDED RELEASE ORAL at 08:57

## 2022-05-25 RX ADMIN — HEPARIN SODIUM SCH UNITS: 5000 INJECTION, SOLUTION INTRAVENOUS; SUBCUTANEOUS at 00:38

## 2022-05-25 RX ADMIN — OXYBUTYNIN CHLORIDE SCH MG: 5 TABLET, FILM COATED, EXTENDED RELEASE ORAL at 08:10

## 2022-05-25 RX ADMIN — INSULIN LISPRO SCH UNITS: 100 INJECTION, SOLUTION INTRAVENOUS; SUBCUTANEOUS at 11:45

## 2022-05-25 RX ADMIN — BUPROPION HYDROCHLORIDE SCH MG: 150 TABLET, EXTENDED RELEASE ORAL at 08:10

## 2022-05-25 RX ADMIN — POTASSIUM CHLORIDE SCH MEQ: 1500 TABLET, EXTENDED RELEASE ORAL at 08:11

## 2022-05-25 RX ADMIN — HEPARIN SODIUM SCH UNITS: 5000 INJECTION, SOLUTION INTRAVENOUS; SUBCUTANEOUS at 08:07

## 2022-05-25 RX ADMIN — HEPARIN SODIUM SCH UNITS: 5000 INJECTION, SOLUTION INTRAVENOUS; SUBCUTANEOUS at 16:57

## 2022-05-25 RX ADMIN — INSULIN LISPRO SCH UNITS: 100 INJECTION, SOLUTION INTRAVENOUS; SUBCUTANEOUS at 17:14

## 2022-05-25 RX ADMIN — INSULIN GLARGINE SCH: 100 INJECTION, SOLUTION SUBCUTANEOUS at 08:12

## 2022-05-25 RX ADMIN — INSULIN LISPRO SCH UNITS: 100 INJECTION, SOLUTION INTRAVENOUS; SUBCUTANEOUS at 20:27

## 2022-05-25 RX ADMIN — INSULIN LISPRO SCH: 100 INJECTION, SOLUTION INTRAVENOUS; SUBCUTANEOUS at 07:50

## 2022-05-25 RX ADMIN — INSULIN GLARGINE SCH UNITS: 100 INJECTION, SOLUTION SUBCUTANEOUS at 08:08

## 2022-05-26 RX ADMIN — BUPROPION HYDROCHLORIDE SCH MG: 150 TABLET, EXTENDED RELEASE ORAL at 08:08

## 2022-05-26 RX ADMIN — POTASSIUM CHLORIDE SCH MEQ: 1500 TABLET, EXTENDED RELEASE ORAL at 08:07

## 2022-05-26 RX ADMIN — HEPARIN SODIUM SCH UNITS: 5000 INJECTION, SOLUTION INTRAVENOUS; SUBCUTANEOUS at 22:59

## 2022-05-26 RX ADMIN — INSULIN LISPRO SCH UNITS: 100 INJECTION, SOLUTION INTRAVENOUS; SUBCUTANEOUS at 17:47

## 2022-05-26 RX ADMIN — HEPARIN SODIUM SCH UNITS: 5000 INJECTION, SOLUTION INTRAVENOUS; SUBCUTANEOUS at 17:02

## 2022-05-26 RX ADMIN — INSULIN LISPRO SCH UNITS: 100 INJECTION, SOLUTION INTRAVENOUS; SUBCUTANEOUS at 08:11

## 2022-05-26 RX ADMIN — INSULIN LISPRO SCH UNITS: 100 INJECTION, SOLUTION INTRAVENOUS; SUBCUTANEOUS at 20:39

## 2022-05-26 RX ADMIN — HEPARIN SODIUM SCH UNITS: 5000 INJECTION, SOLUTION INTRAVENOUS; SUBCUTANEOUS at 08:08

## 2022-05-26 RX ADMIN — Medication SCH EACH: at 12:22

## 2022-05-26 RX ADMIN — INSULIN GLARGINE SCH UNITS: 100 INJECTION, SOLUTION SUBCUTANEOUS at 20:38

## 2022-05-26 RX ADMIN — INSULIN GLARGINE SCH UNITS: 100 INJECTION, SOLUTION SUBCUTANEOUS at 08:09

## 2022-05-26 RX ADMIN — OXYBUTYNIN CHLORIDE SCH MG: 5 TABLET, FILM COATED, EXTENDED RELEASE ORAL at 09:59

## 2022-05-26 RX ADMIN — HEPARIN SODIUM SCH UNITS: 5000 INJECTION, SOLUTION INTRAVENOUS; SUBCUTANEOUS at 00:34

## 2022-05-26 RX ADMIN — INSULIN LISPRO SCH UNITS: 100 INJECTION, SOLUTION INTRAVENOUS; SUBCUTANEOUS at 11:26

## 2022-05-27 RX ADMIN — INSULIN LISPRO SCH UNITS: 100 INJECTION, SOLUTION INTRAVENOUS; SUBCUTANEOUS at 21:23

## 2022-05-27 RX ADMIN — HEPARIN SODIUM SCH UNITS: 5000 INJECTION, SOLUTION INTRAVENOUS; SUBCUTANEOUS at 08:32

## 2022-05-27 RX ADMIN — INSULIN LISPRO SCH UNITS: 100 INJECTION, SOLUTION INTRAVENOUS; SUBCUTANEOUS at 12:26

## 2022-05-27 RX ADMIN — INSULIN GLARGINE SCH UNITS: 100 INJECTION, SOLUTION SUBCUTANEOUS at 08:32

## 2022-05-27 RX ADMIN — POTASSIUM CHLORIDE SCH MEQ: 1500 TABLET, EXTENDED RELEASE ORAL at 08:31

## 2022-05-27 RX ADMIN — HEPARIN SODIUM SCH: 5000 INJECTION, SOLUTION INTRAVENOUS; SUBCUTANEOUS at 23:49

## 2022-05-27 RX ADMIN — INSULIN LISPRO SCH: 100 INJECTION, SOLUTION INTRAVENOUS; SUBCUTANEOUS at 17:03

## 2022-05-27 RX ADMIN — HEPARIN SODIUM SCH UNITS: 5000 INJECTION, SOLUTION INTRAVENOUS; SUBCUTANEOUS at 16:59

## 2022-05-27 RX ADMIN — BUPROPION HYDROCHLORIDE SCH MG: 150 TABLET, EXTENDED RELEASE ORAL at 08:31

## 2022-05-27 RX ADMIN — INSULIN GLARGINE SCH UNITS: 100 INJECTION, SOLUTION SUBCUTANEOUS at 21:23

## 2022-05-27 RX ADMIN — OXYBUTYNIN CHLORIDE SCH MG: 5 TABLET, FILM COATED, EXTENDED RELEASE ORAL at 08:31

## 2022-05-27 RX ADMIN — INSULIN LISPRO SCH: 100 INJECTION, SOLUTION INTRAVENOUS; SUBCUTANEOUS at 06:36

## 2022-05-27 RX ADMIN — Medication SCH EACH: at 08:32

## 2022-05-28 RX ADMIN — OXYBUTYNIN CHLORIDE SCH MG: 5 TABLET, FILM COATED, EXTENDED RELEASE ORAL at 09:31

## 2022-05-28 RX ADMIN — HEPARIN SODIUM SCH UNITS: 5000 INJECTION, SOLUTION INTRAVENOUS; SUBCUTANEOUS at 15:18

## 2022-05-28 RX ADMIN — BUPROPION HYDROCHLORIDE SCH MG: 150 TABLET, EXTENDED RELEASE ORAL at 09:32

## 2022-05-28 RX ADMIN — INSULIN GLARGINE SCH UNITS: 100 INJECTION, SOLUTION SUBCUTANEOUS at 09:36

## 2022-05-28 RX ADMIN — HEPARIN SODIUM SCH UNITS: 5000 INJECTION, SOLUTION INTRAVENOUS; SUBCUTANEOUS at 09:33

## 2022-05-28 RX ADMIN — INSULIN LISPRO SCH: 100 INJECTION, SOLUTION INTRAVENOUS; SUBCUTANEOUS at 06:35

## 2022-05-28 RX ADMIN — INSULIN LISPRO SCH: 100 INJECTION, SOLUTION INTRAVENOUS; SUBCUTANEOUS at 21:57

## 2022-05-28 RX ADMIN — INSULIN LISPRO SCH UNITS: 100 INJECTION, SOLUTION INTRAVENOUS; SUBCUTANEOUS at 12:15

## 2022-05-28 RX ADMIN — INSULIN GLARGINE SCH UNITS: 100 INJECTION, SOLUTION SUBCUTANEOUS at 21:58

## 2022-05-28 RX ADMIN — Medication SCH EACH: at 09:36

## 2022-05-28 RX ADMIN — POTASSIUM CHLORIDE SCH MEQ: 1500 TABLET, EXTENDED RELEASE ORAL at 09:35

## 2022-05-28 RX ADMIN — INSULIN LISPRO SCH UNITS: 100 INJECTION, SOLUTION INTRAVENOUS; SUBCUTANEOUS at 16:53

## 2022-05-29 RX ADMIN — BUPROPION HYDROCHLORIDE SCH MG: 150 TABLET, EXTENDED RELEASE ORAL at 08:20

## 2022-05-29 RX ADMIN — Medication SCH EACH: at 08:23

## 2022-05-29 RX ADMIN — POTASSIUM CHLORIDE SCH MEQ: 1500 TABLET, EXTENDED RELEASE ORAL at 08:23

## 2022-05-29 RX ADMIN — HEPARIN SODIUM SCH UNITS: 5000 INJECTION, SOLUTION INTRAVENOUS; SUBCUTANEOUS at 08:17

## 2022-05-29 RX ADMIN — OXYBUTYNIN CHLORIDE SCH MG: 5 TABLET, FILM COATED, EXTENDED RELEASE ORAL at 08:18

## 2022-05-29 RX ADMIN — HEPARIN SODIUM SCH: 5000 INJECTION, SOLUTION INTRAVENOUS; SUBCUTANEOUS at 01:30

## 2022-05-29 RX ADMIN — INSULIN LISPRO SCH UNITS: 100 INJECTION, SOLUTION INTRAVENOUS; SUBCUTANEOUS at 08:16

## 2022-05-29 RX ADMIN — INSULIN LISPRO SCH UNITS: 100 INJECTION, SOLUTION INTRAVENOUS; SUBCUTANEOUS at 21:20

## 2022-05-29 RX ADMIN — INSULIN GLARGINE SCH UNITS: 100 INJECTION, SOLUTION SUBCUTANEOUS at 21:21

## 2022-05-29 RX ADMIN — INSULIN LISPRO SCH UNITS: 100 INJECTION, SOLUTION INTRAVENOUS; SUBCUTANEOUS at 17:23

## 2022-05-29 RX ADMIN — HEPARIN SODIUM SCH UNITS: 5000 INJECTION, SOLUTION INTRAVENOUS; SUBCUTANEOUS at 15:36

## 2022-05-29 RX ADMIN — INSULIN GLARGINE SCH UNITS: 100 INJECTION, SOLUTION SUBCUTANEOUS at 08:16

## 2022-05-29 RX ADMIN — INSULIN LISPRO SCH UNITS: 100 INJECTION, SOLUTION INTRAVENOUS; SUBCUTANEOUS at 12:07

## 2022-05-30 RX ADMIN — INSULIN GLARGINE SCH UNITS: 100 INJECTION, SOLUTION SUBCUTANEOUS at 20:34

## 2022-05-30 RX ADMIN — HEPARIN SODIUM SCH UNITS: 5000 INJECTION, SOLUTION INTRAVENOUS; SUBCUTANEOUS at 08:38

## 2022-05-30 RX ADMIN — Medication SCH EACH: at 08:39

## 2022-05-30 RX ADMIN — OXYBUTYNIN CHLORIDE SCH MG: 5 TABLET, FILM COATED, EXTENDED RELEASE ORAL at 08:34

## 2022-05-30 RX ADMIN — POTASSIUM CHLORIDE SCH MEQ: 1500 TABLET, EXTENDED RELEASE ORAL at 08:37

## 2022-05-30 RX ADMIN — HEPARIN SODIUM SCH UNITS: 5000 INJECTION, SOLUTION INTRAVENOUS; SUBCUTANEOUS at 00:18

## 2022-05-30 RX ADMIN — INSULIN LISPRO SCH: 100 INJECTION, SOLUTION INTRAVENOUS; SUBCUTANEOUS at 07:07

## 2022-05-30 RX ADMIN — INSULIN LISPRO SCH UNITS: 100 INJECTION, SOLUTION INTRAVENOUS; SUBCUTANEOUS at 17:38

## 2022-05-30 RX ADMIN — HEPARIN SODIUM SCH UNITS: 5000 INJECTION, SOLUTION INTRAVENOUS; SUBCUTANEOUS at 15:04

## 2022-05-30 RX ADMIN — INSULIN LISPRO SCH UNITS: 100 INJECTION, SOLUTION INTRAVENOUS; SUBCUTANEOUS at 12:08

## 2022-05-30 RX ADMIN — BUPROPION HYDROCHLORIDE SCH MG: 150 TABLET, EXTENDED RELEASE ORAL at 08:36

## 2022-05-30 RX ADMIN — INSULIN LISPRO SCH UNITS: 100 INJECTION, SOLUTION INTRAVENOUS; SUBCUTANEOUS at 20:33

## 2022-05-30 RX ADMIN — INSULIN GLARGINE SCH UNITS: 100 INJECTION, SOLUTION SUBCUTANEOUS at 08:38

## 2022-05-31 RX ADMIN — INSULIN GLARGINE SCH UNITS: 100 INJECTION, SOLUTION SUBCUTANEOUS at 20:56

## 2022-05-31 RX ADMIN — Medication SCH EACH: at 08:30

## 2022-05-31 RX ADMIN — POTASSIUM CHLORIDE SCH MEQ: 1500 TABLET, EXTENDED RELEASE ORAL at 08:29

## 2022-05-31 RX ADMIN — INSULIN LISPRO SCH: 100 INJECTION, SOLUTION INTRAVENOUS; SUBCUTANEOUS at 06:47

## 2022-05-31 RX ADMIN — INSULIN GLARGINE SCH UNITS: 100 INJECTION, SOLUTION SUBCUTANEOUS at 08:30

## 2022-05-31 RX ADMIN — HEPARIN SODIUM SCH UNITS: 5000 INJECTION, SOLUTION INTRAVENOUS; SUBCUTANEOUS at 00:38

## 2022-05-31 RX ADMIN — INSULIN LISPRO SCH UNITS: 100 INJECTION, SOLUTION INTRAVENOUS; SUBCUTANEOUS at 17:19

## 2022-05-31 RX ADMIN — OXYBUTYNIN CHLORIDE SCH MG: 5 TABLET, FILM COATED, EXTENDED RELEASE ORAL at 08:28

## 2022-05-31 RX ADMIN — HEPARIN SODIUM SCH UNITS: 5000 INJECTION, SOLUTION INTRAVENOUS; SUBCUTANEOUS at 23:28

## 2022-05-31 RX ADMIN — INSULIN LISPRO SCH UNITS: 100 INJECTION, SOLUTION INTRAVENOUS; SUBCUTANEOUS at 20:55

## 2022-05-31 RX ADMIN — HEPARIN SODIUM SCH UNITS: 5000 INJECTION, SOLUTION INTRAVENOUS; SUBCUTANEOUS at 17:20

## 2022-05-31 RX ADMIN — BUPROPION HYDROCHLORIDE SCH MG: 150 TABLET, EXTENDED RELEASE ORAL at 08:29

## 2022-05-31 RX ADMIN — HEPARIN SODIUM SCH UNITS: 5000 INJECTION, SOLUTION INTRAVENOUS; SUBCUTANEOUS at 08:29

## 2022-05-31 RX ADMIN — INSULIN LISPRO SCH UNITS: 100 INJECTION, SOLUTION INTRAVENOUS; SUBCUTANEOUS at 11:46

## 2022-06-01 RX ADMIN — POTASSIUM CHLORIDE SCH MEQ: 1500 TABLET, EXTENDED RELEASE ORAL at 08:48

## 2022-06-01 RX ADMIN — INSULIN LISPRO SCH: 100 INJECTION, SOLUTION INTRAVENOUS; SUBCUTANEOUS at 06:47

## 2022-06-01 RX ADMIN — Medication SCH EACH: at 08:49

## 2022-06-01 RX ADMIN — OXYBUTYNIN CHLORIDE SCH MG: 5 TABLET, FILM COATED, EXTENDED RELEASE ORAL at 08:48

## 2022-06-01 RX ADMIN — INSULIN LISPRO SCH UNITS: 100 INJECTION, SOLUTION INTRAVENOUS; SUBCUTANEOUS at 17:34

## 2022-06-01 RX ADMIN — HEPARIN SODIUM SCH UNITS: 5000 INJECTION, SOLUTION INTRAVENOUS; SUBCUTANEOUS at 17:00

## 2022-06-01 RX ADMIN — INSULIN GLARGINE SCH UNITS: 100 INJECTION, SOLUTION SUBCUTANEOUS at 20:55

## 2022-06-01 RX ADMIN — INSULIN GLARGINE SCH UNITS: 100 INJECTION, SOLUTION SUBCUTANEOUS at 08:54

## 2022-06-01 RX ADMIN — INSULIN LISPRO SCH UNITS: 100 INJECTION, SOLUTION INTRAVENOUS; SUBCUTANEOUS at 20:56

## 2022-06-01 RX ADMIN — BUPROPION HYDROCHLORIDE SCH MG: 150 TABLET, EXTENDED RELEASE ORAL at 08:48

## 2022-06-01 RX ADMIN — HEPARIN SODIUM SCH UNITS: 5000 INJECTION, SOLUTION INTRAVENOUS; SUBCUTANEOUS at 08:47

## 2022-06-01 RX ADMIN — INSULIN LISPRO SCH UNITS: 100 INJECTION, SOLUTION INTRAVENOUS; SUBCUTANEOUS at 11:54

## 2022-06-01 RX ADMIN — HEPARIN SODIUM SCH UNITS: 5000 INJECTION, SOLUTION INTRAVENOUS; SUBCUTANEOUS at 23:18

## 2022-06-02 RX ADMIN — INSULIN LISPRO SCH: 100 INJECTION, SOLUTION INTRAVENOUS; SUBCUTANEOUS at 07:40

## 2022-06-02 RX ADMIN — INSULIN GLARGINE SCH UNITS: 100 INJECTION, SOLUTION SUBCUTANEOUS at 21:07

## 2022-06-02 RX ADMIN — HEPARIN SODIUM SCH UNITS: 5000 INJECTION, SOLUTION INTRAVENOUS; SUBCUTANEOUS at 22:48

## 2022-06-02 RX ADMIN — INSULIN LISPRO SCH: 100 INJECTION, SOLUTION INTRAVENOUS; SUBCUTANEOUS at 21:09

## 2022-06-02 RX ADMIN — INSULIN LISPRO SCH UNIT: 100 INJECTION, SOLUTION INTRAVENOUS; SUBCUTANEOUS at 17:43

## 2022-06-02 RX ADMIN — POTASSIUM CHLORIDE SCH MEQ: 1500 TABLET, EXTENDED RELEASE ORAL at 08:51

## 2022-06-02 RX ADMIN — BUPROPION HYDROCHLORIDE SCH MG: 150 TABLET, EXTENDED RELEASE ORAL at 08:51

## 2022-06-02 RX ADMIN — HEPARIN SODIUM SCH UNITS: 5000 INJECTION, SOLUTION INTRAVENOUS; SUBCUTANEOUS at 08:50

## 2022-06-02 RX ADMIN — INSULIN GLARGINE SCH UNITS: 100 INJECTION, SOLUTION SUBCUTANEOUS at 08:51

## 2022-06-02 RX ADMIN — INSULIN LISPRO SCH UNITS: 100 INJECTION, SOLUTION INTRAVENOUS; SUBCUTANEOUS at 12:11

## 2022-06-02 RX ADMIN — HEPARIN SODIUM SCH UNITS: 5000 INJECTION, SOLUTION INTRAVENOUS; SUBCUTANEOUS at 16:42

## 2022-06-02 RX ADMIN — OXYBUTYNIN CHLORIDE SCH MG: 5 TABLET, FILM COATED, EXTENDED RELEASE ORAL at 08:50

## 2022-06-02 RX ADMIN — Medication SCH EACH: at 08:50

## 2022-06-02 RX ADMIN — INSULIN LISPRO SCH: 100 INJECTION, SOLUTION INTRAVENOUS; SUBCUTANEOUS at 16:43

## 2022-06-03 VITALS — SYSTOLIC BLOOD PRESSURE: 145 MMHG | DIASTOLIC BLOOD PRESSURE: 81 MMHG | HEART RATE: 89 BPM

## 2022-06-03 LAB — EGFRCR SERPLBLD CKD-EPI 2021: > 60 ML/MIN (ref 60–?)

## 2022-06-03 RX ADMIN — INSULIN LISPRO SCH UNITS: 100 INJECTION, SOLUTION INTRAVENOUS; SUBCUTANEOUS at 07:31

## 2022-06-03 RX ADMIN — OXYBUTYNIN CHLORIDE SCH MG: 5 TABLET, FILM COATED, EXTENDED RELEASE ORAL at 09:09

## 2022-06-03 RX ADMIN — INSULIN LISPRO SCH: 100 INJECTION, SOLUTION INTRAVENOUS; SUBCUTANEOUS at 13:12

## 2022-06-03 RX ADMIN — BUPROPION HYDROCHLORIDE SCH MG: 150 TABLET, EXTENDED RELEASE ORAL at 09:08

## 2022-06-03 RX ADMIN — INSULIN LISPRO SCH: 100 INJECTION, SOLUTION INTRAVENOUS; SUBCUTANEOUS at 12:51

## 2022-06-03 RX ADMIN — HEPARIN SODIUM SCH: 5000 INJECTION, SOLUTION INTRAVENOUS; SUBCUTANEOUS at 02:39

## 2022-06-03 RX ADMIN — Medication SCH EACH: at 09:08

## 2022-06-03 RX ADMIN — HEPARIN SODIUM SCH UNITS: 5000 INJECTION, SOLUTION INTRAVENOUS; SUBCUTANEOUS at 09:07

## 2022-06-03 RX ADMIN — INSULIN LISPRO SCH UNIT: 100 INJECTION, SOLUTION INTRAVENOUS; SUBCUTANEOUS at 07:32

## 2022-06-03 RX ADMIN — INSULIN GLARGINE SCH: 100 INJECTION, SOLUTION SUBCUTANEOUS at 09:09

## 2022-06-03 RX ADMIN — INSULIN LISPRO SCH UNITS: 100 INJECTION, SOLUTION INTRAVENOUS; SUBCUTANEOUS at 13:02

## 2022-06-03 RX ADMIN — INSULIN GLARGINE SCH UNITS: 100 INJECTION, SOLUTION SUBCUTANEOUS at 07:33

## 2022-06-03 RX ADMIN — POTASSIUM CHLORIDE SCH MEQ: 1500 TABLET, EXTENDED RELEASE ORAL at 09:08

## 2022-06-09 ENCOUNTER — HOSPITAL ENCOUNTER (EMERGENCY)
Dept: HOSPITAL 41 - JD.ED | Age: 67
Discharge: HOME | End: 2022-06-09
Payer: MEDICARE

## 2022-06-09 VITALS — DIASTOLIC BLOOD PRESSURE: 92 MMHG | SYSTOLIC BLOOD PRESSURE: 106 MMHG | HEART RATE: 115 BPM

## 2022-06-09 DIAGNOSIS — E11.9: ICD-10-CM

## 2022-06-09 DIAGNOSIS — F17.210: ICD-10-CM

## 2022-06-09 DIAGNOSIS — Z79.4: ICD-10-CM

## 2022-06-09 DIAGNOSIS — G89.29: Primary | ICD-10-CM

## 2022-06-09 DIAGNOSIS — E66.9: ICD-10-CM

## 2022-06-09 DIAGNOSIS — Z88.6: ICD-10-CM

## 2022-06-09 DIAGNOSIS — Z90.49: ICD-10-CM

## 2022-06-09 DIAGNOSIS — E78.00: ICD-10-CM

## 2022-06-09 DIAGNOSIS — Z88.5: ICD-10-CM

## 2022-06-09 DIAGNOSIS — J44.9: ICD-10-CM

## 2022-06-09 DIAGNOSIS — Z79.899: ICD-10-CM

## 2022-06-09 DIAGNOSIS — M25.551: ICD-10-CM

## 2022-06-09 LAB — EGFRCR SERPLBLD CKD-EPI 2021: 56 ML/MIN (ref 60–?)

## 2022-06-10 ENCOUNTER — HOSPITAL ENCOUNTER (EMERGENCY)
Dept: HOSPITAL 41 - JD.ED | Age: 67
Discharge: HOME | End: 2022-06-10
Payer: MEDICARE

## 2022-06-10 VITALS — DIASTOLIC BLOOD PRESSURE: 78 MMHG | SYSTOLIC BLOOD PRESSURE: 118 MMHG | HEART RATE: 66 BPM

## 2022-06-10 DIAGNOSIS — E03.9: ICD-10-CM

## 2022-06-10 DIAGNOSIS — Z79.899: ICD-10-CM

## 2022-06-10 DIAGNOSIS — J44.9: ICD-10-CM

## 2022-06-10 DIAGNOSIS — M19.90: ICD-10-CM

## 2022-06-10 DIAGNOSIS — R42: Primary | ICD-10-CM

## 2022-06-10 DIAGNOSIS — Z88.5: ICD-10-CM

## 2022-06-10 DIAGNOSIS — E11.65: ICD-10-CM

## 2022-06-10 DIAGNOSIS — Z79.4: ICD-10-CM

## 2022-06-10 DIAGNOSIS — E66.9: ICD-10-CM

## 2022-06-10 DIAGNOSIS — E78.00: ICD-10-CM

## 2022-06-10 LAB — EGFRCR SERPLBLD CKD-EPI 2021: > 60 ML/MIN (ref 60–?)

## 2022-06-10 PROCEDURE — 96360 HYDRATION IV INFUSION INIT: CPT

## 2022-06-10 PROCEDURE — 82800 BLOOD PH: CPT

## 2022-06-10 PROCEDURE — 82947 ASSAY GLUCOSE BLOOD QUANT: CPT

## 2022-06-10 PROCEDURE — 36415 COLL VENOUS BLD VENIPUNCTURE: CPT

## 2022-06-10 PROCEDURE — 99284 EMERGENCY DEPT VISIT MOD MDM: CPT

## 2022-06-10 PROCEDURE — 80053 COMPREHEN METABOLIC PANEL: CPT

## 2022-06-10 PROCEDURE — 82009 KETONE BODYS QUAL: CPT

## 2022-06-10 PROCEDURE — 85025 COMPLETE CBC W/AUTO DIFF WBC: CPT

## 2022-06-10 PROCEDURE — 83930 ASSAY OF BLOOD OSMOLALITY: CPT

## 2022-06-17 ENCOUNTER — HOSPITAL ENCOUNTER (EMERGENCY)
Dept: HOSPITAL 41 - JD.ED | Age: 67
Discharge: HOME | End: 2022-06-17
Payer: MEDICARE

## 2022-06-17 VITALS — DIASTOLIC BLOOD PRESSURE: 55 MMHG | SYSTOLIC BLOOD PRESSURE: 108 MMHG | HEART RATE: 92 BPM

## 2022-06-17 DIAGNOSIS — F20.89: ICD-10-CM

## 2022-06-17 DIAGNOSIS — Z88.5: ICD-10-CM

## 2022-06-17 DIAGNOSIS — E78.00: ICD-10-CM

## 2022-06-17 DIAGNOSIS — E66.9: ICD-10-CM

## 2022-06-17 DIAGNOSIS — Z79.899: ICD-10-CM

## 2022-06-17 DIAGNOSIS — E03.9: ICD-10-CM

## 2022-06-17 DIAGNOSIS — Z79.4: ICD-10-CM

## 2022-06-17 DIAGNOSIS — E11.65: ICD-10-CM

## 2022-06-17 DIAGNOSIS — N39.0: Primary | ICD-10-CM

## 2022-06-17 DIAGNOSIS — J44.9: ICD-10-CM

## 2022-06-17 LAB — EGFRCR SERPLBLD CKD-EPI 2021: 82 ML/MIN (ref 60–?)

## 2022-06-17 PROCEDURE — 82947 ASSAY GLUCOSE BLOOD QUANT: CPT

## 2022-06-17 PROCEDURE — 36415 COLL VENOUS BLD VENIPUNCTURE: CPT

## 2022-06-17 PROCEDURE — 70551 MRI BRAIN STEM W/O DYE: CPT

## 2022-06-17 PROCEDURE — 70498 CT ANGIOGRAPHY NECK: CPT

## 2022-06-17 PROCEDURE — 83735 ASSAY OF MAGNESIUM: CPT

## 2022-06-17 PROCEDURE — 86140 C-REACTIVE PROTEIN: CPT

## 2022-06-17 PROCEDURE — 96361 HYDRATE IV INFUSION ADD-ON: CPT

## 2022-06-17 PROCEDURE — 80053 COMPREHEN METABOLIC PANEL: CPT

## 2022-06-17 PROCEDURE — 85730 THROMBOPLASTIN TIME PARTIAL: CPT

## 2022-06-17 PROCEDURE — 99284 EMERGENCY DEPT VISIT MOD MDM: CPT

## 2022-06-17 PROCEDURE — 84484 ASSAY OF TROPONIN QUANT: CPT

## 2022-06-17 PROCEDURE — 93005 ELECTROCARDIOGRAM TRACING: CPT

## 2022-06-17 PROCEDURE — 85610 PROTHROMBIN TIME: CPT

## 2022-06-17 PROCEDURE — 85025 COMPLETE CBC W/AUTO DIFF WBC: CPT

## 2022-06-17 PROCEDURE — 70496 CT ANGIOGRAPHY HEAD: CPT

## 2022-06-17 PROCEDURE — 87086 URINE CULTURE/COLONY COUNT: CPT

## 2022-06-17 PROCEDURE — 81001 URINALYSIS AUTO W/SCOPE: CPT

## 2022-06-17 PROCEDURE — 96365 THER/PROPH/DIAG IV INF INIT: CPT

## 2022-06-17 PROCEDURE — 70450 CT HEAD/BRAIN W/O DYE: CPT

## 2022-06-21 ENCOUNTER — HOSPITAL ENCOUNTER (EMERGENCY)
Dept: HOSPITAL 41 - JD.ED | Age: 67
Discharge: PSYCHIATRIC OR SUBSTANCE USE FACILITY | End: 2022-06-21
Payer: MEDICARE

## 2022-06-21 VITALS — DIASTOLIC BLOOD PRESSURE: 80 MMHG | HEART RATE: 101 BPM | SYSTOLIC BLOOD PRESSURE: 117 MMHG

## 2022-06-21 DIAGNOSIS — Z20.822: ICD-10-CM

## 2022-06-21 DIAGNOSIS — J44.9: ICD-10-CM

## 2022-06-21 DIAGNOSIS — Z88.5: ICD-10-CM

## 2022-06-21 DIAGNOSIS — F20.89: Primary | ICD-10-CM

## 2022-06-21 DIAGNOSIS — M19.90: ICD-10-CM

## 2022-06-21 DIAGNOSIS — Z79.4: ICD-10-CM

## 2022-06-21 DIAGNOSIS — E78.00: ICD-10-CM

## 2022-06-21 LAB
APAP SERPL-MCNC: 0 UG/ML (ref 10–30)
EGFRCR SERPLBLD CKD-EPI 2021: 82 ML/MIN (ref 60–?)

## 2022-06-21 PROCEDURE — 80143 DRUG ASSAY ACETAMINOPHEN: CPT

## 2022-06-21 PROCEDURE — U0002 COVID-19 LAB TEST NON-CDC: HCPCS

## 2022-06-21 PROCEDURE — 80053 COMPREHEN METABOLIC PANEL: CPT

## 2022-06-21 PROCEDURE — 80306 DRUG TEST PRSMV INSTRMNT: CPT

## 2022-06-21 PROCEDURE — 81003 URINALYSIS AUTO W/O SCOPE: CPT

## 2022-06-21 PROCEDURE — 99285 EMERGENCY DEPT VISIT HI MDM: CPT

## 2022-06-21 PROCEDURE — 84443 ASSAY THYROID STIM HORMONE: CPT

## 2022-06-21 PROCEDURE — 80179 DRUG ASSAY SALICYLATE: CPT

## 2022-06-21 PROCEDURE — 80307 DRUG TEST PRSMV CHEM ANLYZR: CPT

## 2022-06-21 PROCEDURE — 83735 ASSAY OF MAGNESIUM: CPT

## 2022-06-21 PROCEDURE — 86140 C-REACTIVE PROTEIN: CPT

## 2022-06-21 PROCEDURE — 85025 COMPLETE CBC W/AUTO DIFF WBC: CPT

## 2022-06-21 PROCEDURE — 36415 COLL VENOUS BLD VENIPUNCTURE: CPT

## 2023-03-29 ENCOUNTER — HOSPITAL ENCOUNTER (OUTPATIENT)
Dept: HOSPITAL 41 - JD.SDS | Age: 68
Discharge: HOME | End: 2023-03-29
Attending: ORTHOPAEDIC SURGERY
Payer: MEDICARE

## 2023-03-29 VITALS — HEART RATE: 86 BPM | DIASTOLIC BLOOD PRESSURE: 68 MMHG | SYSTOLIC BLOOD PRESSURE: 96 MMHG

## 2023-03-29 DIAGNOSIS — E11.21: ICD-10-CM

## 2023-03-29 DIAGNOSIS — Z86.19: ICD-10-CM

## 2023-03-29 DIAGNOSIS — Z88.5: ICD-10-CM

## 2023-03-29 DIAGNOSIS — N32.81: ICD-10-CM

## 2023-03-29 DIAGNOSIS — G25.81: ICD-10-CM

## 2023-03-29 DIAGNOSIS — F41.1: ICD-10-CM

## 2023-03-29 DIAGNOSIS — Z79.4: ICD-10-CM

## 2023-03-29 DIAGNOSIS — Z79.899: ICD-10-CM

## 2023-03-29 DIAGNOSIS — Z79.84: ICD-10-CM

## 2023-03-29 DIAGNOSIS — M16.11: Primary | ICD-10-CM

## 2023-03-29 DIAGNOSIS — G89.4: ICD-10-CM

## 2023-03-29 DIAGNOSIS — E03.9: ICD-10-CM

## 2023-03-29 DIAGNOSIS — E78.5: ICD-10-CM

## 2023-03-29 DIAGNOSIS — Z87.891: ICD-10-CM

## 2023-03-29 DIAGNOSIS — Z88.8: ICD-10-CM

## 2023-03-29 DIAGNOSIS — J44.9: ICD-10-CM

## 2023-03-29 DIAGNOSIS — E11.65: ICD-10-CM

## 2023-03-29 PROCEDURE — 73501 X-RAY EXAM HIP UNI 1 VIEW: CPT

## 2023-03-29 PROCEDURE — 27130 TOTAL HIP ARTHROPLASTY: CPT

## 2023-03-29 PROCEDURE — 97162 PT EVAL MOD COMPLEX 30 MIN: CPT

## 2023-03-29 PROCEDURE — 0055T BONE SRGRY CMPTR CT/MRI IMAG: CPT

## 2023-03-29 PROCEDURE — 86901 BLOOD TYPING SEROLOGIC RH(D): CPT

## 2023-03-29 PROCEDURE — 97110 THERAPEUTIC EXERCISES: CPT

## 2023-03-29 PROCEDURE — 97116 GAIT TRAINING THERAPY: CPT

## 2023-03-29 PROCEDURE — 86850 RBC ANTIBODY SCREEN: CPT

## 2023-03-29 PROCEDURE — C1713 ANCHOR/SCREW BN/BN,TIS/BN: HCPCS

## 2023-03-29 PROCEDURE — C1776 JOINT DEVICE (IMPLANTABLE): HCPCS

## 2023-03-29 PROCEDURE — 86900 BLOOD TYPING SEROLOGIC ABO: CPT

## 2023-03-29 PROCEDURE — 36415 COLL VENOUS BLD VENIPUNCTURE: CPT

## 2023-07-07 ENCOUNTER — HOSPITAL ENCOUNTER (EMERGENCY)
Dept: HOSPITAL 41 - JD.ED | Age: 68
Discharge: HOME | End: 2023-07-07
Payer: MEDICARE

## 2023-07-07 VITALS — SYSTOLIC BLOOD PRESSURE: 129 MMHG | HEART RATE: 86 BPM | DIASTOLIC BLOOD PRESSURE: 87 MMHG

## 2023-07-07 DIAGNOSIS — K52.9: Primary | ICD-10-CM

## 2023-07-07 DIAGNOSIS — Z79.82: ICD-10-CM

## 2023-07-07 DIAGNOSIS — Z79.4: ICD-10-CM

## 2023-07-07 DIAGNOSIS — Z98.890: ICD-10-CM

## 2023-07-07 DIAGNOSIS — E11.9: ICD-10-CM

## 2023-07-07 DIAGNOSIS — Z88.6: ICD-10-CM

## 2023-07-07 DIAGNOSIS — E66.9: ICD-10-CM

## 2023-07-07 DIAGNOSIS — Z88.5: ICD-10-CM

## 2023-07-07 DIAGNOSIS — M19.90: ICD-10-CM

## 2023-07-07 DIAGNOSIS — Z79.899: ICD-10-CM

## 2023-07-07 DIAGNOSIS — E03.9: ICD-10-CM

## 2023-07-07 DIAGNOSIS — N30.00: ICD-10-CM

## 2023-07-07 DIAGNOSIS — J44.9: ICD-10-CM

## 2023-07-07 LAB
ALBUMIN SERPL-MCNC: 2.8 G/DL (ref 3.4–5)
ALBUMIN/GLOB SERPL: 0.6 {RATIO} (ref 1–2)
ALP SERPL-CCNC: 202 U/L (ref 46–116)
ALT SERPL-CCNC: 76 U/L (ref 14–59)
ANION GAP SERPL CALC-SCNC: 9.1 MMOL/L (ref 5–15)
APPEARANCE UR: (no result)
AST SERPL-CCNC: 63 U/L (ref 15–37)
BACTERIA URNS QL MICRO: (no result) /HPF
BASOPHILS # BLD AUTO: 0.02 K/MM3 (ref 0.01–0.08)
BASOPHILS NFR BLD AUTO: 0.4 % (ref 0.1–1.2)
BILIRUB SERPL-MCNC: 0.4 MG/DL (ref 0.2–1)
BILIRUB UR STRIP-MCNC: NEGATIVE MG/DL
BUN SERPL-MCNC: 17 MG/DL (ref 7–18)
BUN/CREAT SERPL: 18.9 (ref 14–18)
CALCIUM SERPL-MCNC: 8.6 MG/DL (ref 8.5–10.1)
CHLORIDE SERPL-SCNC: 99 MEQ/L (ref 98–107)
CO2 SERPL-SCNC: 29 MEQ/L (ref 21–32)
COLOR UR: YELLOW
CREAT CL 24H UR+SERPL-VRATE: 56.08 ML/MIN
CREAT SERPL-MCNC: 0.9 MG/DL (ref 0.55–1.02)
CRP SERPL-MCNC: 2 MG/DL (ref ?–1)
EGFRCR SERPLBLD CKD-EPI 2021: 71 ML/MIN (ref 60–?)
EOSINOPHIL # BLD AUTO: 0.09 K/MM3 (ref 0.04–0.36)
EOSINOPHIL NFR BLD AUTO: 1.9 % (ref 0.7–5.8)
EPI CELLS #/AREA URNS HPF: (no result) /HPF (ref 0–5)
GLOBULIN SER-MCNC: 4.8 GM/DL
GLUCOSE SERPL-MCNC: 324 MG/DL (ref 70–99)
GLUCOSE UR STRIP-MCNC: (no result) MG/DL
HCT VFR BLD AUTO: 39.9 % (ref 34.1–44.9)
HGB BLD-MCNC: 12.4 GM/DL (ref 11.2–15.7)
IMM GRANULOCYTES # BLD: 0.01 K/MM3 (ref 0–0.1)
IMM GRANULOCYTES NFR BLD: 0.2 % (ref ?–1)
KETONES UR STRIP-MCNC: NEGATIVE MG/DL
LIPASE SERPL-CCNC: < 10 U/L (ref 73–393)
LYMPHOCYTES # BLD AUTO: 0.73 K/MM3 (ref 1.18–3.74)
LYMPHOCYTES NFR BLD AUTO: 15.4 % (ref 19.3–51.7)
MAGNESIUM SERPL-MCNC: 1.9 MG/DL (ref 1.8–2.4)
MCH RBC QN AUTO: 27.3 PG (ref 25.6–32.2)
MCHC RBC AUTO-ENTMCNC: 31.1 G/DL (ref 32.2–35.5)
MCHC RBC AUTO-ENTMCNC: 87.9 FL (ref 79.4–94.8)
MONOCYTES # BLD AUTO: 0.46 K/MM3 (ref 0.24–0.36)
MONOCYTES NFR BLD AUTO: 9.7 % (ref 4.7–12.5)
MUCOUS THREADS URNS QL MICRO: (no result) /HPF
NEUTROPHILS # BLD AUTO: 3.42 K/MM3 (ref 1.56–6.13)
NEUTROPHILS NFR BLD AUTO: 72.4 % (ref 34–71.1)
NITRITE UR QL: POSITIVE
PH UR STRIP: 6.5 [PH] (ref 5–8)
PLATELET # BLD AUTO: 161 K/MM3 (ref 182–369)
PMV BLD AUTO: 11.2 FL (ref 9.4–12.3)
POTASSIUM SERPL-SCNC: 4.1 MEQ/L (ref 3.5–5.1)
PROT SERPL-MCNC: 7.6 G/DL (ref 6.4–8.2)
PROT UR STRIP-MCNC: NEGATIVE MG/DL
RBC # BLD AUTO: 4.54 M/MM3 (ref 3.98–5.22)
RBC # URNS HPF: (no result) /HPF (ref 0–5)
RBC UR QL: (no result)
SODIUM SERPL-SCNC: 133 MEQ/L (ref 136–145)
SP GR UR STRIP: 1.02 (ref 1–1.03)
UROBILINOGEN UR STRIP-ACNC: 0.2 (ref 0.2–1)
WBC # BLD AUTO: 4.73 K/MM3 (ref 3.98–10.04)
WBC UR QL: (no result) /HPF (ref 0–5)

## 2023-07-07 PROCEDURE — 87086 URINE CULTURE/COLONY COUNT: CPT

## 2023-07-07 PROCEDURE — 83735 ASSAY OF MAGNESIUM: CPT

## 2023-07-07 PROCEDURE — 85025 COMPLETE CBC W/AUTO DIFF WBC: CPT

## 2023-07-07 PROCEDURE — 96361 HYDRATE IV INFUSION ADD-ON: CPT

## 2023-07-07 PROCEDURE — 87088 URINE BACTERIA CULTURE: CPT

## 2023-07-07 PROCEDURE — 36415 COLL VENOUS BLD VENIPUNCTURE: CPT

## 2023-07-07 PROCEDURE — 81001 URINALYSIS AUTO W/SCOPE: CPT

## 2023-07-07 PROCEDURE — 87186 SC STD MICRODIL/AGAR DIL: CPT

## 2023-07-07 PROCEDURE — 83690 ASSAY OF LIPASE: CPT

## 2023-07-07 PROCEDURE — 80053 COMPREHEN METABOLIC PANEL: CPT

## 2023-07-07 PROCEDURE — 96374 THER/PROPH/DIAG INJ IV PUSH: CPT

## 2023-07-07 PROCEDURE — 86140 C-REACTIVE PROTEIN: CPT

## 2023-07-07 PROCEDURE — 99284 EMERGENCY DEPT VISIT MOD MDM: CPT

## 2023-07-07 PROCEDURE — 87493 C DIFF AMPLIFIED PROBE: CPT

## 2023-07-07 PROCEDURE — 74177 CT ABD & PELVIS W/CONTRAST: CPT

## 2024-08-19 ENCOUNTER — HOSPITAL ENCOUNTER (EMERGENCY)
Dept: HOSPITAL 41 - JD.ED | Age: 69
Discharge: HOME | End: 2024-08-19
Payer: MEDICARE

## 2024-08-19 VITALS — HEART RATE: 72 BPM | SYSTOLIC BLOOD PRESSURE: 106 MMHG | DIASTOLIC BLOOD PRESSURE: 60 MMHG

## 2024-08-19 DIAGNOSIS — J44.9: ICD-10-CM

## 2024-08-19 DIAGNOSIS — E66.9: ICD-10-CM

## 2024-08-19 DIAGNOSIS — N39.0: Primary | ICD-10-CM

## 2024-08-19 DIAGNOSIS — Z79.51: ICD-10-CM

## 2024-08-19 DIAGNOSIS — I50.9: ICD-10-CM

## 2024-08-19 DIAGNOSIS — E78.00: ICD-10-CM

## 2024-08-19 DIAGNOSIS — Z88.5: ICD-10-CM

## 2024-08-19 DIAGNOSIS — E03.9: ICD-10-CM

## 2024-08-19 DIAGNOSIS — Z88.6: ICD-10-CM

## 2024-08-19 DIAGNOSIS — Z79.82: ICD-10-CM

## 2024-08-19 DIAGNOSIS — Z79.84: ICD-10-CM

## 2024-08-19 DIAGNOSIS — E11.40: ICD-10-CM

## 2024-08-19 DIAGNOSIS — Z79.890: ICD-10-CM

## 2024-08-19 DIAGNOSIS — Z79.899: ICD-10-CM

## 2024-08-19 LAB
ALBUMIN SERPL-MCNC: 3 G/DL (ref 3.4–5)
ALBUMIN/GLOB SERPL: 0.7 {RATIO} (ref 1–2)
ALP SERPL-CCNC: 108 U/L (ref 46–116)
ALT SERPL-CCNC: 44 U/L (ref 14–59)
ANION GAP SERPL CALC-SCNC: 7.3 MMOL/L (ref 5–15)
APPEARANCE UR: (no result)
AST SERPL-CCNC: 45 U/L (ref 15–37)
BACTERIA URNS QL MICRO: (no result) /HPF
BASOPHILS # BLD AUTO: 0 K/MM3 (ref 0–0.2)
BASOPHILS NFR BLD AUTO: 0.3 % (ref 0–1)
BILIRUB SERPL-MCNC: 0.8 MG/DL (ref 0.2–1)
BILIRUB UR STRIP-MCNC: NEGATIVE MG/DL
BUN SERPL-MCNC: 23 MG/DL (ref 7–18)
BUN/CREAT SERPL: 20.9 (ref 14–18)
CALCIUM SERPL-MCNC: 8.7 MG/DL (ref 8.5–10.1)
CHLORIDE SERPL-SCNC: 101 MEQ/L (ref 98–107)
CO2 SERPL-SCNC: 32 MEQ/L (ref 21–32)
COLOR UR: YELLOW
CREAT CL 24H UR+SERPL-VRATE: 43.44 ML/MIN
CREAT SERPL-MCNC: 1.1 MG/DL (ref 0.55–1.02)
CRP SERPL-MCNC: 0.47 MG/DL (ref ?–0.3)
EGFRCR SERPLBLD CKD-EPI 2021: 55 ML/MIN (ref 60–?)
EOSINOPHIL # BLD AUTO: 0.3 K/MM3 (ref 0–0.4)
EOSINOPHIL NFR BLD AUTO: 5.2 % (ref 0–6)
FLUAV RNA UPPER RESP QL NAA+PROBE: NEGATIVE
GLOBULIN SER-MCNC: 4.2 GM/DL
GLUCOSE SERPL-MCNC: 296 MG/DL (ref 70–99)
GLUCOSE UR STRIP-MCNC: NEGATIVE MG/DL
HBA1C BLD-MCNC: 9.1 %
HCT VFR BLD AUTO: 39.3 % (ref 37–47)
HGB BLD-MCNC: 12.8 GM/DL (ref 12–16)
IMM GRANULOCYTES # BLD: 0.01 K/MM3 (ref 0–0.05)
IMM GRANULOCYTES NFR BLD: 0.2 % (ref 0–0.4)
KETONES UR STRIP-MCNC: (no result) MG/DL
LACTATE SERPL-SCNC: 1.1 MMOL/L (ref 0.4–2)
LYMPHOCYTES # BLD AUTO: 1.1 K/MM3 (ref 1–4.8)
LYMPHOCYTES NFR BLD AUTO: 19 % (ref 24–44)
MAGNESIUM SERPL-MCNC: 1.6 MG/DL (ref 1.8–2.4)
MCH RBC QN AUTO: 29.1 PG (ref 28–32)
MCHC RBC AUTO-ENTMCNC: 32.6 G/DL (ref 32–36)
MCHC RBC AUTO-ENTMCNC: 89.3 FL (ref 83–99)
MONOCYTES # BLD AUTO: 0.4 K/MM3 (ref 0–0.8)
MONOCYTES NFR BLD AUTO: 7.3 % (ref 0–8)
MUCOUS THREADS URNS QL MICRO: (no result) /HPF
NEUTROPHILS # BLD AUTO: 3.9 K/MM3 (ref 1.8–7.7)
NEUTROPHILS NFR BLD AUTO: 68 % (ref 41–71)
NITRITE UR QL: NEGATIVE
NRBC BLD AUTO-RTO: 0 % (ref 0–0.02)
NRBC BLD AUTO-RTO: 0 % (ref 0–0.2)
PH UR STRIP: 6 [PH] (ref 5–8)
PLATELET # BLD AUTO: 107 K/MM3 (ref 150–400)
PMV BLD AUTO: 12.5 FL (ref 9.4–12.3)
POTASSIUM SERPL-SCNC: 3.3 MEQ/L (ref 3.5–5.1)
PROT SERPL-MCNC: 7.2 G/DL (ref 6.4–8.2)
PROT UR STRIP-MCNC: NEGATIVE MG/DL
RBC # BLD AUTO: 4.4 M/MM3 (ref 4.1–5.3)
RBC # URNS HPF: (no result) /HPF (ref 0–5)
RBC UR QL: NEGATIVE
RSV RNA UPPER RESP QL NAA+PROBE: NEGATIVE
SARS-COV-2 RNA RESP QL NAA+PROBE: NEGATIVE
SODIUM SERPL-SCNC: 137 MEQ/L (ref 136–145)
SP GR UR STRIP: 1.02 (ref 1–1.03)
SQUAMOUS #/AREA URNS HPF: (no result) /HPF (ref 0–5)
TROPONIN I SERPL HS-IMP: 4 PG/ML (ref ?–51)
UROBILINOGEN UR STRIP-ACNC: 1 (ref 0.2–1)
WBC # BLD AUTO: 5.75 K/MM3 (ref 3.9–11.3)
WBC UR QL: (no result) /HPF (ref 0–5)

## 2024-08-19 PROCEDURE — 87086 URINE CULTURE/COLONY COUNT: CPT

## 2024-08-19 PROCEDURE — 36415 COLL VENOUS BLD VENIPUNCTURE: CPT

## 2024-08-19 PROCEDURE — 87040 BLOOD CULTURE FOR BACTERIA: CPT

## 2024-08-19 PROCEDURE — 83735 ASSAY OF MAGNESIUM: CPT

## 2024-08-19 PROCEDURE — 84484 ASSAY OF TROPONIN QUANT: CPT

## 2024-08-19 PROCEDURE — 0241U: CPT

## 2024-08-19 PROCEDURE — 96365 THER/PROPH/DIAG IV INF INIT: CPT

## 2024-08-19 PROCEDURE — 80053 COMPREHEN METABOLIC PANEL: CPT

## 2024-08-19 PROCEDURE — 81001 URINALYSIS AUTO W/SCOPE: CPT

## 2024-08-19 PROCEDURE — 83036 HEMOGLOBIN GLYCOSYLATED A1C: CPT

## 2024-08-19 PROCEDURE — 99285 EMERGENCY DEPT VISIT HI MDM: CPT

## 2024-08-19 PROCEDURE — 96361 HYDRATE IV INFUSION ADD-ON: CPT

## 2024-08-19 PROCEDURE — C1758 CATHETER, URETERAL: HCPCS

## 2024-08-19 PROCEDURE — 87186 SC STD MICRODIL/AGAR DIL: CPT

## 2024-08-19 PROCEDURE — 83605 ASSAY OF LACTIC ACID: CPT

## 2024-08-19 PROCEDURE — 87088 URINE BACTERIA CULTURE: CPT

## 2024-08-19 PROCEDURE — 82947 ASSAY GLUCOSE BLOOD QUANT: CPT

## 2024-08-19 PROCEDURE — 86140 C-REACTIVE PROTEIN: CPT

## 2024-08-19 PROCEDURE — 83880 ASSAY OF NATRIURETIC PEPTIDE: CPT

## 2024-08-19 PROCEDURE — 93005 ELECTROCARDIOGRAM TRACING: CPT

## 2024-08-19 PROCEDURE — 71045 X-RAY EXAM CHEST 1 VIEW: CPT

## 2024-08-19 PROCEDURE — 85025 COMPLETE CBC W/AUTO DIFF WBC: CPT

## 2024-08-26 ENCOUNTER — HOSPITAL ENCOUNTER (EMERGENCY)
Dept: HOSPITAL 41 - JD.ED | Age: 69
LOS: 1 days | Discharge: HOME | End: 2024-08-27
Payer: MEDICARE

## 2024-08-26 DIAGNOSIS — R53.83: ICD-10-CM

## 2024-08-26 DIAGNOSIS — E66.9: ICD-10-CM

## 2024-08-26 DIAGNOSIS — J44.9: ICD-10-CM

## 2024-08-26 DIAGNOSIS — F22: ICD-10-CM

## 2024-08-26 DIAGNOSIS — E11.9: ICD-10-CM

## 2024-08-26 DIAGNOSIS — Z79.82: ICD-10-CM

## 2024-08-26 DIAGNOSIS — Z88.6: ICD-10-CM

## 2024-08-26 DIAGNOSIS — R41.0: ICD-10-CM

## 2024-08-26 DIAGNOSIS — E78.00: ICD-10-CM

## 2024-08-26 DIAGNOSIS — N30.00: Primary | ICD-10-CM

## 2024-08-26 DIAGNOSIS — Z88.5: ICD-10-CM

## 2024-08-26 DIAGNOSIS — I50.9: ICD-10-CM

## 2024-08-26 DIAGNOSIS — E03.9: ICD-10-CM

## 2024-08-26 DIAGNOSIS — Z79.899: ICD-10-CM

## 2024-08-26 LAB
ALBUMIN SERPL-MCNC: 2.6 G/DL (ref 3.4–5)
ALBUMIN/GLOB SERPL: 0.6 {RATIO} (ref 1–2)
ALP SERPL-CCNC: 133 U/L (ref 46–116)
ALT SERPL-CCNC: 41 U/L (ref 14–59)
ANION GAP SERPL CALC-SCNC: 8.3 MMOL/L (ref 5–15)
APPEARANCE UR: CLEAR
APTT PPP: 27.6 SECONDS (ref 21.7–31.4)
AST SERPL-CCNC: 49 U/L (ref 15–37)
BACTERIA URNS QL MICRO: (no result) /HPF
BASOPHILS # BLD AUTO: 0 K/MM3 (ref 0–0.2)
BASOPHILS NFR BLD AUTO: 0.6 % (ref 0–1)
BILIRUB SERPL-MCNC: 0.5 MG/DL (ref 0.2–1)
BILIRUB UR STRIP-MCNC: NEGATIVE MG/DL
BUN SERPL-MCNC: 23 MG/DL (ref 7–18)
BUN/CREAT SERPL: 19.2 (ref 14–18)
CALCIUM SERPL-MCNC: 8.6 MG/DL (ref 8.5–10.1)
CHLORIDE SERPL-SCNC: 104 MEQ/L (ref 98–107)
CO2 SERPL-SCNC: 30 MEQ/L (ref 21–32)
COLOR UR: YELLOW
CREAT CL 24H UR+SERPL-VRATE: 39.82 ML/MIN
CREAT SERPL-MCNC: 1.2 MG/DL (ref 0.55–1.02)
EGFRCR SERPLBLD CKD-EPI 2021: 50 ML/MIN (ref 60–?)
EOSINOPHIL # BLD AUTO: 0.2 K/MM3 (ref 0–0.4)
EOSINOPHIL NFR BLD AUTO: 6.8 % (ref 0–6)
GLOBULIN SER-MCNC: 4.1 GM/DL
GLUCOSE SERPL-MCNC: 275 MG/DL (ref 70–99)
GLUCOSE UR STRIP-MCNC: (no result) MG/DL
HCT VFR BLD AUTO: 37.9 % (ref 37–47)
HGB BLD-MCNC: 12.1 GM/DL (ref 12–16)
IMM GRANULOCYTES # BLD: 0.01 K/MM3 (ref 0–0.05)
IMM GRANULOCYTES NFR BLD: 0.3 % (ref 0–0.4)
INR PPP: 1.17
KETONES UR STRIP-MCNC: NEGATIVE MG/DL
LACTATE SERPL-SCNC: 1 MMOL/L (ref 0.4–2)
LYMPHOCYTES # BLD AUTO: 1.1 K/MM3 (ref 1–4.8)
LYMPHOCYTES NFR BLD AUTO: 31.1 % (ref 24–44)
MCH RBC QN AUTO: 29.3 PG (ref 28–32)
MCHC RBC AUTO-ENTMCNC: 31.9 G/DL (ref 32–36)
MCHC RBC AUTO-ENTMCNC: 91.8 FL (ref 83–99)
MONOCYTES # BLD AUTO: 0.3 K/MM3 (ref 0–0.8)
MONOCYTES NFR BLD AUTO: 9.1 % (ref 0–8)
MUCOUS THREADS URNS QL MICRO: (no result) /HPF
NEUTROPHILS # BLD AUTO: 1.8 K/MM3 (ref 1.8–7.7)
NEUTROPHILS NFR BLD AUTO: 52.1 % (ref 41–71)
NITRITE UR QL: NEGATIVE
NRBC BLD AUTO-RTO: 0 % (ref 0–0.02)
NRBC BLD AUTO-RTO: 0 % (ref 0–0.2)
PH UR STRIP: 7 [PH] (ref 5–8)
PLATELET # BLD AUTO: 101 K/MM3 (ref 150–400)
PMV BLD AUTO: 12.3 FL (ref 9.4–12.3)
POTASSIUM SERPL-SCNC: 4.3 MEQ/L (ref 3.5–5.1)
PROT SERPL-MCNC: 6.7 G/DL (ref 6.4–8.2)
PROT UR STRIP-MCNC: NEGATIVE MG/DL
PROTHROMBIN TIME: 12.3 SECONDS (ref 9.7–12)
RBC # BLD AUTO: 4.13 M/MM3 (ref 4.1–5.3)
RBC # URNS HPF: (no result) /HPF (ref 0–5)
RBC UR QL: NEGATIVE
SODIUM SERPL-SCNC: 138 MEQ/L (ref 136–145)
SP GR UR STRIP: 1.02 (ref 1–1.03)
SQUAMOUS #/AREA URNS HPF: (no result) /HPF (ref 0–5)
TROPONIN I SERPL HS-IMP: < 4 PG/ML (ref ?–51)
UROBILINOGEN UR STRIP-ACNC: 1 (ref 0.2–1)
WBC # BLD AUTO: 3.51 K/MM3 (ref 3.9–11.3)
WBC UR QL: (no result) /HPF (ref 0–5)

## 2024-08-26 PROCEDURE — 70450 CT HEAD/BRAIN W/O DYE: CPT

## 2024-08-26 PROCEDURE — 81001 URINALYSIS AUTO W/SCOPE: CPT

## 2024-08-26 PROCEDURE — 85730 THROMBOPLASTIN TIME PARTIAL: CPT

## 2024-08-26 PROCEDURE — 83605 ASSAY OF LACTIC ACID: CPT

## 2024-08-26 PROCEDURE — 99284 EMERGENCY DEPT VISIT MOD MDM: CPT

## 2024-08-26 PROCEDURE — 80053 COMPREHEN METABOLIC PANEL: CPT

## 2024-08-26 PROCEDURE — 36415 COLL VENOUS BLD VENIPUNCTURE: CPT

## 2024-08-26 PROCEDURE — 82947 ASSAY GLUCOSE BLOOD QUANT: CPT

## 2024-08-26 PROCEDURE — 70496 CT ANGIOGRAPHY HEAD: CPT

## 2024-08-26 PROCEDURE — 85025 COMPLETE CBC W/AUTO DIFF WBC: CPT

## 2024-08-26 PROCEDURE — 85610 PROTHROMBIN TIME: CPT

## 2024-08-26 PROCEDURE — 70498 CT ANGIOGRAPHY NECK: CPT

## 2024-08-26 PROCEDURE — 87086 URINE CULTURE/COLONY COUNT: CPT

## 2024-08-26 PROCEDURE — 84484 ASSAY OF TROPONIN QUANT: CPT

## 2024-08-26 PROCEDURE — 71046 X-RAY EXAM CHEST 2 VIEWS: CPT

## 2024-08-26 RX ADMIN — IOPAMIDOL ONE ML: 755 INJECTION, SOLUTION INTRAVENOUS at 18:54

## 2024-08-27 ENCOUNTER — HOSPITAL ENCOUNTER (EMERGENCY)
Dept: HOSPITAL 41 - JD.ED | Age: 69
Discharge: HOME | End: 2024-08-27
Payer: MEDICARE

## 2024-08-27 VITALS — HEART RATE: 103 BPM | SYSTOLIC BLOOD PRESSURE: 159 MMHG | DIASTOLIC BLOOD PRESSURE: 93 MMHG

## 2024-08-27 VITALS — SYSTOLIC BLOOD PRESSURE: 149 MMHG | DIASTOLIC BLOOD PRESSURE: 87 MMHG | HEART RATE: 74 BPM

## 2024-08-27 DIAGNOSIS — E03.9: ICD-10-CM

## 2024-08-27 DIAGNOSIS — E78.00: ICD-10-CM

## 2024-08-27 DIAGNOSIS — I50.9: ICD-10-CM

## 2024-08-27 DIAGNOSIS — Z79.899: ICD-10-CM

## 2024-08-27 DIAGNOSIS — Z79.82: ICD-10-CM

## 2024-08-27 DIAGNOSIS — Z79.84: ICD-10-CM

## 2024-08-27 DIAGNOSIS — E11.9: ICD-10-CM

## 2024-08-27 DIAGNOSIS — Z88.5: ICD-10-CM

## 2024-08-27 DIAGNOSIS — E66.9: ICD-10-CM

## 2024-08-27 DIAGNOSIS — R19.7: Primary | ICD-10-CM

## 2024-08-27 DIAGNOSIS — Z88.6: ICD-10-CM

## 2024-08-27 DIAGNOSIS — J44.9: ICD-10-CM

## 2024-08-27 LAB
ALBUMIN SERPL-MCNC: 3.1 G/DL (ref 3.4–5)
ALBUMIN/GLOB SERPL: 0.7 {RATIO} (ref 1–2)
ALP SERPL-CCNC: 144 U/L (ref 46–116)
ALT SERPL-CCNC: 44 U/L (ref 14–59)
AMPHET UR QL SCN: NEGATIVE
AMPHET UR QL SCN: NEGATIVE
ANION GAP SERPL CALC-SCNC: 10.3 MMOL/L (ref 5–15)
APAP SERPL-MCNC: 0 UG/ML (ref 10–30)
APPEARANCE UR: CLEAR
AST SERPL-CCNC: 52 U/L (ref 15–37)
BACTERIA URNS QL MICRO: (no result) /HPF
BARBITURATES UR QL SCN: NEGATIVE
BASOPHILS # BLD AUTO: 0 K/MM3 (ref 0–0.2)
BASOPHILS NFR BLD AUTO: 0.7 % (ref 0–1)
BENZODIAZ UR QL SCN: NEGATIVE
BILIRUB SERPL-MCNC: 0.7 MG/DL (ref 0.2–1)
BILIRUB UR STRIP-MCNC: NEGATIVE MG/DL
BUN SERPL-MCNC: 13 MG/DL (ref 7–18)
BUN/CREAT SERPL: 14.4 (ref 14–18)
BUPRENORPHINE UR QL: NEGATIVE
CALCIUM SERPL-MCNC: 8.9 MG/DL (ref 8.5–10.1)
CHLORIDE SERPL-SCNC: 101 MEQ/L (ref 98–107)
CO2 SERPL-SCNC: 29 MEQ/L (ref 21–32)
COLOR UR: YELLOW
CREAT CL 24H UR+SERPL-VRATE: 55.33 ML/MIN
CREAT SERPL-MCNC: 0.9 MG/DL (ref 0.55–1.02)
EGFRCR SERPLBLD CKD-EPI 2021: 71 ML/MIN (ref 60–?)
EOSINOPHIL # BLD AUTO: 0.1 K/MM3 (ref 0–0.4)
EOSINOPHIL NFR BLD AUTO: 1.7 % (ref 0–6)
EPI CELLS #/AREA URNS HPF: (no result) /HPF (ref 0–5)
ETHANOL BLD-MCNC: 0 GM%
GLOBULIN SER-MCNC: 4.6 GM/DL
GLUCOSE SERPL-MCNC: 278 MG/DL (ref 70–99)
GLUCOSE UR STRIP-MCNC: (no result) MG/DL
HCT VFR BLD AUTO: 39.8 % (ref 37–47)
HGB BLD-MCNC: 13 GM/DL (ref 12–16)
IMM GRANULOCYTES # BLD: 0.02 K/MM3 (ref 0–0.05)
IMM GRANULOCYTES NFR BLD: 0.5 % (ref 0–0.4)
KETONES UR STRIP-MCNC: (no result) MG/DL
LYMPHOCYTES # BLD AUTO: 0.8 K/MM3 (ref 1–4.8)
LYMPHOCYTES NFR BLD AUTO: 19 % (ref 24–44)
MCH RBC QN AUTO: 29.1 PG (ref 28–32)
MCHC RBC AUTO-ENTMCNC: 32.7 G/DL (ref 32–36)
MCHC RBC AUTO-ENTMCNC: 89.2 FL (ref 83–99)
METHADONE UR QL SCN: NEGATIVE
MONOCYTES # BLD AUTO: 0.3 K/MM3 (ref 0–0.8)
MONOCYTES NFR BLD AUTO: 7.1 % (ref 0–8)
MUCOUS THREADS URNS QL MICRO: (no result) /HPF
NEUTROPHILS # BLD AUTO: 3 K/MM3 (ref 1.8–7.7)
NEUTROPHILS NFR BLD AUTO: 71 % (ref 41–71)
NITRITE UR QL: NEGATIVE
NRBC BLD AUTO-RTO: 0 % (ref 0–0.02)
NRBC BLD AUTO-RTO: 0 % (ref 0–0.2)
OXYCODONE UR QL SCN: NEGATIVE
PH UR STRIP: 7 [PH] (ref 5–8)
PLATELET # BLD AUTO: 106 K/MM3 (ref 150–400)
PMV BLD AUTO: 12.7 FL (ref 9.4–12.3)
POTASSIUM SERPL-SCNC: 4.3 MEQ/L (ref 3.5–5.1)
PROT SERPL-MCNC: 7.7 G/DL (ref 6.4–8.2)
PROT UR STRIP-MCNC: NEGATIVE MG/DL
RBC # BLD AUTO: 4.46 M/MM3 (ref 4.1–5.3)
RBC # URNS HPF: (no result) /HPF (ref 0–5)
RBC UR QL: (no result)
SODIUM SERPL-SCNC: 136 MEQ/L (ref 136–145)
SP GR UR STRIP: 1.01 (ref 1–1.03)
THC UR QL SCN>20 NG/ML: NEGATIVE
TSH SERPL DL<=0.005 MIU/L-ACNC: 1.75 UIU/ML (ref 0.36–3.74)
UROBILINOGEN UR STRIP-ACNC: 0.2 (ref 0.2–1)
WBC # BLD AUTO: 4.22 K/MM3 (ref 3.9–11.3)
WBC UR QL: (no result) /HPF (ref 0–5)

## 2024-08-28 ENCOUNTER — HOSPITAL ENCOUNTER (EMERGENCY)
Dept: HOSPITAL 41 - JD.ED | Age: 69
Discharge: NURSING, REHAB OR LONG-TERM CARE | End: 2024-08-28
Payer: MEDICARE

## 2024-08-28 VITALS — HEART RATE: 97 BPM | SYSTOLIC BLOOD PRESSURE: 148 MMHG | DIASTOLIC BLOOD PRESSURE: 89 MMHG

## 2024-08-28 DIAGNOSIS — E78.00: ICD-10-CM

## 2024-08-28 DIAGNOSIS — N30.00: Primary | ICD-10-CM

## 2024-08-28 DIAGNOSIS — I50.9: ICD-10-CM

## 2024-08-28 DIAGNOSIS — E11.9: ICD-10-CM

## 2024-08-28 DIAGNOSIS — Z88.6: ICD-10-CM

## 2024-08-28 DIAGNOSIS — Z79.82: ICD-10-CM

## 2024-08-28 DIAGNOSIS — Z79.899: ICD-10-CM

## 2024-08-28 DIAGNOSIS — E03.9: ICD-10-CM

## 2024-08-28 DIAGNOSIS — Z79.84: ICD-10-CM

## 2024-08-28 DIAGNOSIS — J44.9: ICD-10-CM

## 2024-08-28 DIAGNOSIS — I21.4: ICD-10-CM

## 2024-08-28 DIAGNOSIS — Z88.5: ICD-10-CM

## 2024-08-28 LAB
ALBUMIN SERPL-MCNC: 3 G/DL (ref 3.4–5)
ALBUMIN/GLOB SERPL: 0.7 {RATIO} (ref 1–2)
ALP SERPL-CCNC: 135 U/L (ref 46–116)
ALT SERPL-CCNC: 37 U/L (ref 14–59)
AMPHET UR QL SCN: NEGATIVE
AMPHET UR QL SCN: NEGATIVE
ANION GAP SERPL CALC-SCNC: 13.9 MMOL/L (ref 5–15)
APAP SERPL-MCNC: 0 UG/ML (ref 10–30)
APPEARANCE UR: (no result)
APTT PPP: 26.8 SECONDS (ref 21.7–31.4)
AST SERPL-CCNC: 39 U/L (ref 15–37)
BACTERIA URNS QL MICRO: (no result) /HPF
BARBITURATES UR QL SCN: NEGATIVE
BASOPHILS # BLD AUTO: 0 K/MM3 (ref 0–0.2)
BASOPHILS NFR BLD AUTO: 0.2 % (ref 0–1)
BENZODIAZ UR QL SCN: NEGATIVE
BILIRUB SERPL-MCNC: 0.9 MG/DL (ref 0.2–1)
BILIRUB UR STRIP-MCNC: NEGATIVE MG/DL
BUN SERPL-MCNC: 10 MG/DL (ref 7–18)
BUN/CREAT SERPL: 10 (ref 14–18)
BUPRENORPHINE UR QL: NEGATIVE
CALCIUM SERPL-MCNC: 9 MG/DL (ref 8.5–10.1)
CHLORIDE SERPL-SCNC: 100 MEQ/L (ref 98–107)
CO2 SERPL-SCNC: 26 MEQ/L (ref 21–32)
COLOR UR: YELLOW
CREAT CL 24H UR+SERPL-VRATE: 49.8 ML/MIN
CREAT SERPL-MCNC: 1 MG/DL (ref 0.55–1.02)
EGFRCR SERPLBLD CKD-EPI 2021: 62 ML/MIN (ref 60–?)
EOSINOPHIL # BLD AUTO: 0.1 K/MM3 (ref 0–0.4)
EOSINOPHIL NFR BLD AUTO: 1 % (ref 0–6)
ETHANOL BLD-MCNC: 0 GM%
FLUAV RNA UPPER RESP QL NAA+PROBE: NEGATIVE
GLOBULIN SER-MCNC: 4.6 GM/DL
GLUCOSE SERPL-MCNC: 388 MG/DL (ref 70–99)
GLUCOSE UR STRIP-MCNC: (no result) MG/DL
HCT VFR BLD AUTO: 40.1 % (ref 37–47)
HGB BLD-MCNC: 13.1 GM/DL (ref 12–16)
IMM GRANULOCYTES # BLD: 0.01 K/MM3 (ref 0–0.05)
IMM GRANULOCYTES NFR BLD: 0.2 % (ref 0–0.4)
INR PPP: 1.17
KETONES UR STRIP-MCNC: (no result) MG/DL
LACTATE SERPL-SCNC: 1.3 MMOL/L (ref 0.4–2)
LYMPHOCYTES # BLD AUTO: 0.7 K/MM3 (ref 1–4.8)
LYMPHOCYTES NFR BLD AUTO: 13 % (ref 24–44)
MCH RBC QN AUTO: 29.2 PG (ref 28–32)
MCHC RBC AUTO-ENTMCNC: 32.7 G/DL (ref 32–36)
MCHC RBC AUTO-ENTMCNC: 89.5 FL (ref 83–99)
METHADONE UR QL SCN: NEGATIVE
MONOCYTES # BLD AUTO: 0.3 K/MM3 (ref 0–0.8)
MONOCYTES NFR BLD AUTO: 6.1 % (ref 0–8)
MUCOUS THREADS URNS QL MICRO: (no result) /HPF
NEUTROPHILS # BLD AUTO: 4 K/MM3 (ref 1.8–7.7)
NEUTROPHILS NFR BLD AUTO: 79.5 % (ref 41–71)
NITRITE UR QL: NEGATIVE
NRBC BLD AUTO-RTO: 0 % (ref 0–0.02)
NRBC BLD AUTO-RTO: 0 % (ref 0–0.2)
OXYCODONE UR QL SCN: NEGATIVE
PH UR STRIP: 7.5 [PH] (ref 5–8)
PLATELET # BLD AUTO: 105 K/MM3 (ref 150–400)
PMV BLD AUTO: 12.2 FL (ref 9.4–12.3)
POTASSIUM SERPL-SCNC: 3.9 MEQ/L (ref 3.5–5.1)
PROT SERPL-MCNC: 7.6 G/DL (ref 6.4–8.2)
PROT UR STRIP-MCNC: (no result) MG/DL
PROTHROMBIN TIME: 12.3 SECONDS (ref 9.7–12)
RBC # BLD AUTO: 4.48 M/MM3 (ref 4.1–5.3)
RBC # URNS HPF: (no result) /HPF (ref 0–5)
RBC UR QL: (no result)
RSV RNA UPPER RESP QL NAA+PROBE: NEGATIVE
SARS-COV-2 RNA RESP QL NAA+PROBE: NEGATIVE
SODIUM SERPL-SCNC: 136 MEQ/L (ref 136–145)
SP GR UR STRIP: 1.02 (ref 1–1.03)
SQUAMOUS #/AREA URNS HPF: (no result) /HPF (ref 0–5)
THC UR QL SCN>20 NG/ML: NEGATIVE
TSH SERPL DL<=0.005 MIU/L-ACNC: 2.93 UIU/ML (ref 0.36–3.74)
UROBILINOGEN UR STRIP-ACNC: 1 (ref 0.2–1)
WBC # BLD AUTO: 5.08 K/MM3 (ref 3.9–11.3)
WBC CLUMPS #/AREA URNS HPF: (no result) /HPF
WBC UR QL: (no result) /HPF (ref 0–5)

## 2024-08-28 PROCEDURE — 83880 ASSAY OF NATRIURETIC PEPTIDE: CPT

## 2024-08-28 PROCEDURE — 84484 ASSAY OF TROPONIN QUANT: CPT

## 2024-08-28 PROCEDURE — 96361 HYDRATE IV INFUSION ADD-ON: CPT

## 2024-08-28 PROCEDURE — 80307 DRUG TEST PRSMV CHEM ANLYZR: CPT

## 2024-08-28 PROCEDURE — 85610 PROTHROMBIN TIME: CPT

## 2024-08-28 PROCEDURE — 85730 THROMBOPLASTIN TIME PARTIAL: CPT

## 2024-08-28 PROCEDURE — 81001 URINALYSIS AUTO W/SCOPE: CPT

## 2024-08-28 PROCEDURE — 93005 ELECTROCARDIOGRAM TRACING: CPT

## 2024-08-28 PROCEDURE — 83735 ASSAY OF MAGNESIUM: CPT

## 2024-08-28 PROCEDURE — 87086 URINE CULTURE/COLONY COUNT: CPT

## 2024-08-28 PROCEDURE — 82947 ASSAY GLUCOSE BLOOD QUANT: CPT

## 2024-08-28 PROCEDURE — 84443 ASSAY THYROID STIM HORMONE: CPT

## 2024-08-28 PROCEDURE — 96365 THER/PROPH/DIAG IV INF INIT: CPT

## 2024-08-28 PROCEDURE — 80179 DRUG ASSAY SALICYLATE: CPT

## 2024-08-28 PROCEDURE — 71045 X-RAY EXAM CHEST 1 VIEW: CPT

## 2024-08-28 PROCEDURE — 36415 COLL VENOUS BLD VENIPUNCTURE: CPT

## 2024-08-28 PROCEDURE — 96367 TX/PROPH/DG ADDL SEQ IV INF: CPT

## 2024-08-28 PROCEDURE — 80306 DRUG TEST PRSMV INSTRMNT: CPT

## 2024-08-28 PROCEDURE — 83605 ASSAY OF LACTIC ACID: CPT

## 2024-08-28 PROCEDURE — 80053 COMPREHEN METABOLIC PANEL: CPT

## 2024-08-28 PROCEDURE — 85025 COMPLETE CBC W/AUTO DIFF WBC: CPT

## 2024-08-28 PROCEDURE — 0241U: CPT

## 2024-08-28 PROCEDURE — 80143 DRUG ASSAY ACETAMINOPHEN: CPT

## 2024-08-28 PROCEDURE — 99285 EMERGENCY DEPT VISIT HI MDM: CPT

## 2024-08-28 PROCEDURE — 96372 THER/PROPH/DIAG INJ SC/IM: CPT

## 2024-08-28 RX ADMIN — SODIUM CHLORIDE ONE INJ: 9 INJECTION, SOLUTION INTRAVENOUS at 16:06

## 2024-08-28 RX ADMIN — INSULIN HUMAN ONE UNIT: 100 INJECTION, SOLUTION PARENTERAL at 16:06

## 2024-08-28 RX ADMIN — Medication PRN ML: at 18:24

## 2024-08-28 RX ADMIN — HEPARIN SODIUM ONE UNITS: 5000 INJECTION, SOLUTION INTRAVENOUS; SUBCUTANEOUS at 23:16

## 2024-09-01 ENCOUNTER — HOSPITAL ENCOUNTER (EMERGENCY)
Dept: HOSPITAL 41 - JD.ED | Age: 69
Discharge: PSYCHIATRIC OR SUBSTANCE USE FACILITY | End: 2024-09-01
Payer: MEDICARE

## 2024-09-01 VITALS — HEART RATE: 98 BPM | DIASTOLIC BLOOD PRESSURE: 81 MMHG | SYSTOLIC BLOOD PRESSURE: 144 MMHG

## 2024-09-01 DIAGNOSIS — Z79.899: ICD-10-CM

## 2024-09-01 DIAGNOSIS — F29: Primary | ICD-10-CM

## 2024-09-01 DIAGNOSIS — Z79.82: ICD-10-CM

## 2024-09-01 DIAGNOSIS — E03.9: ICD-10-CM

## 2024-09-01 DIAGNOSIS — Z88.5: ICD-10-CM

## 2024-09-01 DIAGNOSIS — E11.9: ICD-10-CM

## 2024-09-01 DIAGNOSIS — E66.9: ICD-10-CM

## 2024-09-01 DIAGNOSIS — Z79.890: ICD-10-CM

## 2024-09-01 DIAGNOSIS — Z88.8: ICD-10-CM

## 2024-09-01 DIAGNOSIS — E78.00: ICD-10-CM

## 2024-09-01 LAB
ALBUMIN SERPL-MCNC: 3.1 G/DL (ref 3.4–5)
ALBUMIN/GLOB SERPL: 0.7 {RATIO} (ref 1–2)
ALP SERPL-CCNC: 131 U/L (ref 46–116)
ALT SERPL-CCNC: 44 U/L (ref 14–59)
AMPHET UR QL SCN: NEGATIVE
AMPHET UR QL SCN: NEGATIVE
ANION GAP SERPL CALC-SCNC: 11.3 MMOL/L (ref 5–15)
APAP SERPL-MCNC: 0 UG/ML (ref 10–30)
APPEARANCE UR: CLEAR
AST SERPL-CCNC: 44 U/L (ref 15–37)
BARBITURATES UR QL SCN: NEGATIVE
BASOPHILS # BLD AUTO: 0 K/MM3 (ref 0–0.2)
BASOPHILS NFR BLD AUTO: 0.5 % (ref 0–1)
BENZODIAZ UR QL SCN: NEGATIVE
BILIRUB SERPL-MCNC: 0.6 MG/DL (ref 0.2–1)
BILIRUB UR STRIP-MCNC: NEGATIVE MG/DL
BUN SERPL-MCNC: 15 MG/DL (ref 7–18)
BUN/CREAT SERPL: 16.7 (ref 14–18)
BUPRENORPHINE UR QL: NEGATIVE
CALCIUM SERPL-MCNC: 9 MG/DL (ref 8.5–10.1)
CHLORIDE SERPL-SCNC: 104 MEQ/L (ref 98–107)
CO2 SERPL-SCNC: 27 MEQ/L (ref 21–32)
COLOR UR: (no result)
CREAT CL 24H UR+SERPL-VRATE: 55.33 ML/MIN
CREAT SERPL-MCNC: 0.9 MG/DL (ref 0.55–1.02)
EGFRCR SERPLBLD CKD-EPI 2021: 71 ML/MIN (ref 60–?)
EOSINOPHIL # BLD AUTO: 0.1 K/MM3 (ref 0–0.4)
EOSINOPHIL NFR BLD AUTO: 3.1 % (ref 0–6)
ETHANOL BLD-MCNC: 0 GM%
GLOBULIN SER-MCNC: 4.8 GM/DL
GLUCOSE SERPL-MCNC: 365 MG/DL (ref 70–99)
GLUCOSE UR STRIP-MCNC: (no result) MG/DL
HCT VFR BLD AUTO: 40.8 % (ref 37–47)
HGB BLD-MCNC: 13 GM/DL (ref 12–16)
IMM GRANULOCYTES # BLD: 0.02 K/MM3 (ref 0–0.05)
IMM GRANULOCYTES NFR BLD: 0.5 % (ref 0–0.4)
KETONES UR STRIP-MCNC: (no result) MG/DL
LYMPHOCYTES # BLD AUTO: 0.8 K/MM3 (ref 1–4.8)
LYMPHOCYTES NFR BLD AUTO: 18 % (ref 24–44)
MCH RBC QN AUTO: 28.6 PG (ref 28–32)
MCHC RBC AUTO-ENTMCNC: 31.9 G/DL (ref 32–36)
MCHC RBC AUTO-ENTMCNC: 89.9 FL (ref 83–99)
METHADONE UR QL SCN: NEGATIVE
MONOCYTES # BLD AUTO: 0.3 K/MM3 (ref 0–0.8)
MONOCYTES NFR BLD AUTO: 7.6 % (ref 0–8)
NEUTROPHILS # BLD AUTO: 3 K/MM3 (ref 1.8–7.7)
NEUTROPHILS NFR BLD AUTO: 70.3 % (ref 41–71)
NITRITE UR QL: NEGATIVE
NRBC BLD AUTO-RTO: 0 % (ref 0–0.02)
NRBC BLD AUTO-RTO: 0 % (ref 0–0.2)
OXYCODONE UR QL SCN: NEGATIVE
PH UR STRIP: 7 [PH] (ref 5–8)
PLATELET # BLD AUTO: 103 K/MM3 (ref 150–400)
PMV BLD AUTO: 12 FL (ref 9.4–12.3)
POTASSIUM SERPL-SCNC: 4.3 MEQ/L (ref 3.5–5.1)
PROT SERPL-MCNC: 7.9 G/DL (ref 6.4–8.2)
PROT UR STRIP-MCNC: NEGATIVE MG/DL
RBC # BLD AUTO: 4.54 M/MM3 (ref 4.1–5.3)
RBC UR QL: NEGATIVE
SODIUM SERPL-SCNC: 138 MEQ/L (ref 136–145)
SP GR UR STRIP: 1.02 (ref 1–1.03)
THC UR QL SCN>20 NG/ML: NEGATIVE
TSH SERPL DL<=0.005 MIU/L-ACNC: 7.75 UIU/ML (ref 0.36–3.74)
UROBILINOGEN UR STRIP-ACNC: 1 (ref 0.2–1)
WBC # BLD AUTO: 4.22 K/MM3 (ref 3.9–11.3)

## 2024-09-01 PROCEDURE — 80143 DRUG ASSAY ACETAMINOPHEN: CPT

## 2024-09-01 PROCEDURE — 96360 HYDRATION IV INFUSION INIT: CPT

## 2024-09-01 PROCEDURE — 81003 URINALYSIS AUTO W/O SCOPE: CPT

## 2024-09-01 PROCEDURE — 80053 COMPREHEN METABOLIC PANEL: CPT

## 2024-09-01 PROCEDURE — 85025 COMPLETE CBC W/AUTO DIFF WBC: CPT

## 2024-09-01 PROCEDURE — 80179 DRUG ASSAY SALICYLATE: CPT

## 2024-09-01 PROCEDURE — 99285 EMERGENCY DEPT VISIT HI MDM: CPT

## 2024-09-01 PROCEDURE — 80306 DRUG TEST PRSMV INSTRMNT: CPT

## 2024-09-01 PROCEDURE — 84443 ASSAY THYROID STIM HORMONE: CPT

## 2024-09-01 PROCEDURE — 82947 ASSAY GLUCOSE BLOOD QUANT: CPT

## 2024-09-01 PROCEDURE — 93005 ELECTROCARDIOGRAM TRACING: CPT

## 2024-09-01 PROCEDURE — 80307 DRUG TEST PRSMV CHEM ANLYZR: CPT

## 2024-09-01 PROCEDURE — 36415 COLL VENOUS BLD VENIPUNCTURE: CPT

## 2024-09-01 RX ADMIN — INSULIN HUMAN ONE UNIT: 100 INJECTION, SOLUTION PARENTERAL at 06:25

## 2024-09-01 RX ADMIN — Medication ONE MG: at 17:04

## 2024-09-01 RX ADMIN — INSULIN HUMAN ONE UNITS: 100 INJECTION, SOLUTION PARENTERAL at 16:30

## 2024-09-14 ENCOUNTER — HOSPITAL ENCOUNTER (EMERGENCY)
Dept: HOSPITAL 41 - JD.ED | Age: 69
LOS: 1 days | Discharge: NURSING, REHAB OR LONG-TERM CARE | End: 2024-09-15
Payer: MEDICARE

## 2024-09-14 DIAGNOSIS — J44.9: ICD-10-CM

## 2024-09-14 DIAGNOSIS — E78.00: ICD-10-CM

## 2024-09-14 DIAGNOSIS — F60.0: ICD-10-CM

## 2024-09-14 DIAGNOSIS — R44.1: ICD-10-CM

## 2024-09-14 DIAGNOSIS — Z79.84: ICD-10-CM

## 2024-09-14 DIAGNOSIS — R44.0: Primary | ICD-10-CM

## 2024-09-14 DIAGNOSIS — Z79.899: ICD-10-CM

## 2024-09-14 DIAGNOSIS — Z88.6: ICD-10-CM

## 2024-09-14 DIAGNOSIS — E11.40: ICD-10-CM

## 2024-09-14 DIAGNOSIS — E03.9: ICD-10-CM

## 2024-09-14 DIAGNOSIS — E66.9: ICD-10-CM

## 2024-09-14 DIAGNOSIS — Z86.16: ICD-10-CM

## 2024-09-14 DIAGNOSIS — Z88.5: ICD-10-CM

## 2024-09-14 DIAGNOSIS — I50.9: ICD-10-CM

## 2024-09-14 DIAGNOSIS — Z79.82: ICD-10-CM

## 2024-09-14 LAB
ALBUMIN SERPL-MCNC: 3.1 G/DL (ref 3.4–5)
ALBUMIN/GLOB SERPL: 0.6 {RATIO} (ref 1–2)
ALP SERPL-CCNC: 163 U/L (ref 46–116)
ALT SERPL-CCNC: 84 U/L (ref 14–59)
AMPHET UR QL SCN: NEGATIVE
AMPHET UR QL SCN: NEGATIVE
ANION GAP SERPL CALC-SCNC: 14.8 MMOL/L (ref 5–15)
APAP SERPL-MCNC: 0 UG/ML (ref 10–30)
APPEARANCE UR: CLEAR
AST SERPL-CCNC: 89 U/L (ref 15–37)
BACTERIA URNS QL MICRO: (no result) /HPF
BARBITURATES UR QL SCN: NEGATIVE
BASOPHILS # BLD AUTO: 0 K/MM3 (ref 0–0.2)
BASOPHILS NFR BLD AUTO: 0.6 % (ref 0–1)
BENZODIAZ UR QL SCN: NEGATIVE
BILIRUB SERPL-MCNC: 0.6 MG/DL (ref 0.2–1)
BILIRUB UR STRIP-MCNC: NEGATIVE MG/DL
BUN SERPL-MCNC: 12 MG/DL (ref 7–18)
BUN/CREAT SERPL: 17.1 (ref 14–18)
BUPRENORPHINE UR QL: NEGATIVE
CALCIUM SERPL-MCNC: 9.3 MG/DL (ref 8.5–10.1)
CHLORIDE SERPL-SCNC: 103 MEQ/L (ref 98–107)
CO2 SERPL-SCNC: 24 MEQ/L (ref 21–32)
COLOR UR: YELLOW
CREAT CL 24H UR+SERPL-VRATE: 71.14 ML/MIN
CREAT SERPL-MCNC: 0.7 MG/DL (ref 0.55–1.02)
EGFRCR SERPLBLD CKD-EPI 2021: 95 ML/MIN (ref 60–?)
EOSINOPHIL # BLD AUTO: 0.1 K/MM3 (ref 0–0.4)
EOSINOPHIL NFR BLD AUTO: 1.5 % (ref 0–6)
ETHANOL BLD-MCNC: 0 GM%
GLOBULIN SER-MCNC: 5 GM/DL
GLUCOSE SERPL-MCNC: 229 MG/DL (ref 70–99)
GLUCOSE UR STRIP-MCNC: (no result) MG/DL
HCT VFR BLD AUTO: 41.3 % (ref 37–47)
HGB BLD-MCNC: 13.6 GM/DL (ref 12–16)
IMM GRANULOCYTES # BLD: 0.02 K/MM3 (ref 0–0.05)
IMM GRANULOCYTES NFR BLD: 0.4 % (ref 0–0.4)
KETONES UR STRIP-MCNC: (no result) MG/DL
LYMPHOCYTES # BLD AUTO: 0.8 K/MM3 (ref 1–4.8)
LYMPHOCYTES NFR BLD AUTO: 15.6 % (ref 24–44)
MAGNESIUM SERPL-MCNC: 1.3 MG/DL (ref 1.8–2.4)
MCH RBC QN AUTO: 29.6 PG (ref 28–32)
MCHC RBC AUTO-ENTMCNC: 32.9 G/DL (ref 32–36)
MCHC RBC AUTO-ENTMCNC: 89.8 FL (ref 83–99)
METHADONE UR QL SCN: NEGATIVE
MONOCYTES # BLD AUTO: 0.3 K/MM3 (ref 0–0.8)
MONOCYTES NFR BLD AUTO: 6 % (ref 0–8)
MUCOUS THREADS URNS QL MICRO: (no result) /HPF
NEUTROPHILS # BLD AUTO: 3.7 K/MM3 (ref 1.8–7.7)
NEUTROPHILS NFR BLD AUTO: 75.9 % (ref 41–71)
NITRITE UR QL: NEGATIVE
NRBC BLD AUTO-RTO: 0 % (ref 0–0.02)
NRBC BLD AUTO-RTO: 0 % (ref 0–0.2)
OXYCODONE UR QL SCN: NEGATIVE
PH UR STRIP: 7.5 [PH] (ref 5–8)
PLATELET # BLD AUTO: 132 K/MM3 (ref 150–400)
PMV BLD AUTO: 11.8 FL (ref 9.4–12.3)
POTASSIUM SERPL-SCNC: 3.8 MEQ/L (ref 3.5–5.1)
PROT SERPL-MCNC: 8.1 G/DL (ref 6.4–8.2)
PROT UR STRIP-MCNC: NEGATIVE MG/DL
RBC # BLD AUTO: 4.6 M/MM3 (ref 4.1–5.3)
RBC # URNS HPF: (no result) /HPF (ref 0–5)
RBC UR QL: NEGATIVE
SODIUM SERPL-SCNC: 138 MEQ/L (ref 136–145)
SP GR UR STRIP: 1.01 (ref 1–1.03)
SQUAMOUS #/AREA URNS HPF: (no result) /HPF (ref 0–5)
THC UR QL SCN>20 NG/ML: NEGATIVE
TSH SERPL DL<=0.005 MIU/L-ACNC: 3.02 UIU/ML (ref 0.36–3.74)
UROBILINOGEN UR STRIP-ACNC: 0.2 (ref 0.2–1)
WBC # BLD AUTO: 4.81 K/MM3 (ref 3.9–11.3)
WBC UR QL: (no result) /HPF (ref 0–5)

## 2024-09-14 PROCEDURE — 83735 ASSAY OF MAGNESIUM: CPT

## 2024-09-14 PROCEDURE — 84443 ASSAY THYROID STIM HORMONE: CPT

## 2024-09-14 PROCEDURE — 80307 DRUG TEST PRSMV CHEM ANLYZR: CPT

## 2024-09-14 PROCEDURE — 80179 DRUG ASSAY SALICYLATE: CPT

## 2024-09-14 PROCEDURE — 85025 COMPLETE CBC W/AUTO DIFF WBC: CPT

## 2024-09-14 PROCEDURE — 96366 THER/PROPH/DIAG IV INF ADDON: CPT

## 2024-09-14 PROCEDURE — 80306 DRUG TEST PRSMV INSTRMNT: CPT

## 2024-09-14 PROCEDURE — 80143 DRUG ASSAY ACETAMINOPHEN: CPT

## 2024-09-14 PROCEDURE — 96361 HYDRATE IV INFUSION ADD-ON: CPT

## 2024-09-14 PROCEDURE — 87086 URINE CULTURE/COLONY COUNT: CPT

## 2024-09-14 PROCEDURE — 93005 ELECTROCARDIOGRAM TRACING: CPT

## 2024-09-14 PROCEDURE — 96365 THER/PROPH/DIAG IV INF INIT: CPT

## 2024-09-14 PROCEDURE — 81001 URINALYSIS AUTO W/SCOPE: CPT

## 2024-09-14 PROCEDURE — 0241U: CPT

## 2024-09-14 PROCEDURE — 99285 EMERGENCY DEPT VISIT HI MDM: CPT

## 2024-09-14 PROCEDURE — 36415 COLL VENOUS BLD VENIPUNCTURE: CPT

## 2024-09-14 PROCEDURE — 80053 COMPREHEN METABOLIC PANEL: CPT

## 2024-09-15 VITALS — HEART RATE: 97 BPM | SYSTOLIC BLOOD PRESSURE: 161 MMHG | DIASTOLIC BLOOD PRESSURE: 86 MMHG

## 2024-09-15 LAB
FLUAV RNA UPPER RESP QL NAA+PROBE: NEGATIVE
RSV RNA UPPER RESP QL NAA+PROBE: NEGATIVE
SARS-COV-2 RNA RESP QL NAA+PROBE: NEGATIVE

## 2024-09-15 RX ADMIN — MAGNESIUM SULFATE IN WATER ONE MLS/HR: 40 INJECTION, SOLUTION INTRAVENOUS at 00:19

## 2024-10-29 ENCOUNTER — HOSPITAL ENCOUNTER (EMERGENCY)
Dept: HOSPITAL 41 - JD.ED | Age: 69
Discharge: HOME | End: 2024-10-29
Payer: MEDICARE

## 2024-10-29 VITALS — SYSTOLIC BLOOD PRESSURE: 125 MMHG | HEART RATE: 79 BPM | DIASTOLIC BLOOD PRESSURE: 70 MMHG

## 2024-10-29 DIAGNOSIS — E66.9: ICD-10-CM

## 2024-10-29 DIAGNOSIS — Z88.6: ICD-10-CM

## 2024-10-29 DIAGNOSIS — E03.9: ICD-10-CM

## 2024-10-29 DIAGNOSIS — M19.90: ICD-10-CM

## 2024-10-29 DIAGNOSIS — I50.9: ICD-10-CM

## 2024-10-29 DIAGNOSIS — R42: Primary | ICD-10-CM

## 2024-10-29 DIAGNOSIS — Z88.5: ICD-10-CM

## 2024-10-29 DIAGNOSIS — J44.9: ICD-10-CM

## 2024-10-29 DIAGNOSIS — E11.9: ICD-10-CM

## 2024-10-29 DIAGNOSIS — Z86.16: ICD-10-CM

## 2024-10-29 LAB
ALBUMIN SERPL-MCNC: 2.6 G/DL (ref 3.4–5)
ALBUMIN/GLOB SERPL: 0.5 {RATIO} (ref 1–2)
ALP SERPL-CCNC: 161 U/L (ref 46–116)
ALT SERPL-CCNC: 66 U/L (ref 14–59)
ANION GAP SERPL CALC-SCNC: 7.1 MMOL/L (ref 5–15)
AST SERPL-CCNC: 77 U/L (ref 15–37)
BASOPHILS # BLD AUTO: 0 K/MM3 (ref 0–0.2)
BASOPHILS NFR BLD AUTO: 0.5 % (ref 0–1)
BILIRUB SERPL-MCNC: 0.4 MG/DL (ref 0.2–1)
BUN SERPL-MCNC: 25 MG/DL (ref 7–18)
BUN/CREAT SERPL: 27.8 (ref 14–18)
CALCIUM SERPL-MCNC: 8.4 MG/DL (ref 8.5–10.1)
CHLORIDE SERPL-SCNC: 102 MEQ/L (ref 98–107)
CO2 SERPL-SCNC: 33 MEQ/L (ref 21–32)
CREAT CL 24H UR+SERPL-VRATE: 55.33 ML/MIN
CREAT SERPL-MCNC: 0.9 MG/DL (ref 0.55–1.02)
EGFRCR SERPLBLD CKD-EPI 2021: 71 ML/MIN (ref 60–?)
EOSINOPHIL # BLD AUTO: 0.1 K/MM3 (ref 0–0.4)
EOSINOPHIL NFR BLD AUTO: 3.1 % (ref 0–6)
GLOBULIN SER-MCNC: 5 GM/DL
GLUCOSE SERPL-MCNC: 113 MG/DL (ref 70–99)
HCT VFR BLD AUTO: 37.5 % (ref 37–47)
HGB BLD-MCNC: 11.8 GM/DL (ref 12–16)
IMM GRANULOCYTES # BLD: 0.01 K/MM3 (ref 0–0.05)
IMM GRANULOCYTES NFR BLD: 0.2 % (ref 0–0.4)
LYMPHOCYTES # BLD AUTO: 0.5 K/MM3 (ref 1–4.8)
LYMPHOCYTES NFR BLD AUTO: 11.9 % (ref 24–44)
MAGNESIUM SERPL-MCNC: 1.7 MG/DL (ref 1.8–2.4)
MCH RBC QN AUTO: 28.6 PG (ref 28–32)
MCHC RBC AUTO-ENTMCNC: 31.5 G/DL (ref 32–36)
MCHC RBC AUTO-ENTMCNC: 90.8 FL (ref 83–99)
MONOCYTES # BLD AUTO: 0.3 K/MM3 (ref 0–0.8)
MONOCYTES NFR BLD AUTO: 8.2 % (ref 0–8)
NEUTROPHILS # BLD AUTO: 3.1 K/MM3 (ref 1.8–7.7)
NEUTROPHILS NFR BLD AUTO: 76.1 % (ref 41–71)
NRBC BLD AUTO-RTO: 0 % (ref 0–0.02)
NRBC BLD AUTO-RTO: 0 % (ref 0–0.2)
PLATELET # BLD AUTO: 149 K/MM3 (ref 150–400)
PMV BLD AUTO: 11.6 FL (ref 9.4–12.3)
POTASSIUM SERPL-SCNC: 4.1 MEQ/L (ref 3.5–5.1)
PROT SERPL-MCNC: 7.6 G/DL (ref 6.4–8.2)
RBC # BLD AUTO: 4.13 M/MM3 (ref 4.1–5.3)
SODIUM SERPL-SCNC: 138 MEQ/L (ref 136–145)
TROPONIN I SERPL HS-IMP: 6 PG/ML (ref ?–51)
WBC # BLD AUTO: 4.13 K/MM3 (ref 3.9–11.3)

## 2024-10-29 PROCEDURE — 70450 CT HEAD/BRAIN W/O DYE: CPT

## 2024-10-29 PROCEDURE — 93005 ELECTROCARDIOGRAM TRACING: CPT

## 2024-10-29 PROCEDURE — 94640 AIRWAY INHALATION TREATMENT: CPT

## 2024-10-29 PROCEDURE — 84484 ASSAY OF TROPONIN QUANT: CPT

## 2024-10-29 PROCEDURE — 71045 X-RAY EXAM CHEST 1 VIEW: CPT

## 2024-10-29 PROCEDURE — 83880 ASSAY OF NATRIURETIC PEPTIDE: CPT

## 2024-10-29 PROCEDURE — 82947 ASSAY GLUCOSE BLOOD QUANT: CPT

## 2024-10-29 PROCEDURE — 36415 COLL VENOUS BLD VENIPUNCTURE: CPT

## 2024-10-29 PROCEDURE — 83735 ASSAY OF MAGNESIUM: CPT

## 2024-10-29 PROCEDURE — 85025 COMPLETE CBC W/AUTO DIFF WBC: CPT

## 2024-10-29 PROCEDURE — 80053 COMPREHEN METABOLIC PANEL: CPT

## 2024-10-29 PROCEDURE — 99285 EMERGENCY DEPT VISIT HI MDM: CPT
